# Patient Record
Sex: MALE | Race: WHITE | NOT HISPANIC OR LATINO | Employment: OTHER | URBAN - METROPOLITAN AREA
[De-identification: names, ages, dates, MRNs, and addresses within clinical notes are randomized per-mention and may not be internally consistent; named-entity substitution may affect disease eponyms.]

---

## 2023-06-16 LAB — PROSTATE SPECIFIC ANTIGEN,SCREEN: 0.5 NG/ML (ref 0–4)

## 2023-08-23 LAB
ALANINE AMINOTRANSFERASE (SGPT) (U/L) IN SER/PLAS: 19 U/L (ref 10–52)
ALBUMIN (G/DL) IN SER/PLAS: 4.3 G/DL (ref 3.4–5)
ALKALINE PHOSPHATASE (U/L) IN SER/PLAS: 58 U/L (ref 33–136)
ANION GAP IN SER/PLAS: 9 MMOL/L (ref 10–20)
ASPARTATE AMINOTRANSFERASE (SGOT) (U/L) IN SER/PLAS: 24 U/L (ref 9–39)
BILIRUBIN TOTAL (MG/DL) IN SER/PLAS: 1.1 MG/DL (ref 0–1.2)
CALCIUM (MG/DL) IN SER/PLAS: 9.5 MG/DL (ref 8.6–10.3)
CARBON DIOXIDE, TOTAL (MMOL/L) IN SER/PLAS: 28 MMOL/L (ref 21–32)
CHLORIDE (MMOL/L) IN SER/PLAS: 107 MMOL/L (ref 98–107)
CHOLESTEROL (MG/DL) IN SER/PLAS: 175 MG/DL (ref 0–199)
CHOLESTEROL IN HDL (MG/DL) IN SER/PLAS: 39.4 MG/DL
CHOLESTEROL/HDL RATIO: 4.4
CREATININE (MG/DL) IN SER/PLAS: 0.83 MG/DL (ref 0.5–1.3)
ESTIMATED AVERAGE GLUCOSE FOR HBA1C: 126 MG/DL
GFR MALE: >90 ML/MIN/1.73M2
GLUCOSE (MG/DL) IN SER/PLAS: 91 MG/DL (ref 74–99)
HEMOGLOBIN A1C/HEMOGLOBIN TOTAL IN BLOOD: 6 %
LDL: 108 MG/DL (ref 0–99)
POTASSIUM (MMOL/L) IN SER/PLAS: 5 MMOL/L (ref 3.5–5.3)
PROTEIN TOTAL: 6.4 G/DL (ref 6.4–8.2)
SODIUM (MMOL/L) IN SER/PLAS: 139 MMOL/L (ref 136–145)
TRIGLYCERIDE (MG/DL) IN SER/PLAS: 140 MG/DL (ref 0–149)
UREA NITROGEN (MG/DL) IN SER/PLAS: 14 MG/DL (ref 6–23)
VLDL: 28 MG/DL (ref 0–40)

## 2023-11-12 ENCOUNTER — APPOINTMENT (OUTPATIENT)
Dept: RADIOLOGY | Facility: HOSPITAL | Age: 63
End: 2023-11-12
Payer: MEDICARE

## 2023-11-12 ENCOUNTER — HOSPITAL ENCOUNTER (EMERGENCY)
Facility: HOSPITAL | Age: 63
Discharge: HOME | End: 2023-11-12
Attending: EMERGENCY MEDICINE
Payer: MEDICARE

## 2023-11-12 VITALS
SYSTOLIC BLOOD PRESSURE: 136 MMHG | RESPIRATION RATE: 16 BRPM | BODY MASS INDEX: 25.11 KG/M2 | DIASTOLIC BLOOD PRESSURE: 78 MMHG | OXYGEN SATURATION: 97 % | TEMPERATURE: 98.6 F | HEART RATE: 84 BPM | WEIGHT: 160 LBS | HEIGHT: 67 IN

## 2023-11-12 DIAGNOSIS — R42 DIZZINESS: Primary | ICD-10-CM

## 2023-11-12 LAB
ALBUMIN SERPL BCP-MCNC: 4.3 G/DL (ref 3.4–5)
ALP SERPL-CCNC: 58 U/L (ref 33–136)
ALT SERPL W P-5'-P-CCNC: 15 U/L (ref 10–52)
ANION GAP SERPL CALC-SCNC: 12 MMOL/L (ref 10–20)
APTT PPP: 29 SECONDS (ref 27–38)
AST SERPL W P-5'-P-CCNC: 19 U/L (ref 9–39)
BASOPHILS # BLD AUTO: 0.02 X10*3/UL (ref 0–0.1)
BASOPHILS NFR BLD AUTO: 0.3 %
BILIRUB SERPL-MCNC: 1 MG/DL (ref 0–1.2)
BUN SERPL-MCNC: 17 MG/DL (ref 6–23)
CALCIUM SERPL-MCNC: 9.6 MG/DL (ref 8.6–10.3)
CHLORIDE SERPL-SCNC: 106 MMOL/L (ref 98–107)
CO2 SERPL-SCNC: 23 MMOL/L (ref 21–32)
CREAT SERPL-MCNC: 0.86 MG/DL (ref 0.5–1.3)
EOSINOPHIL # BLD AUTO: 0 X10*3/UL (ref 0–0.7)
EOSINOPHIL NFR BLD AUTO: 0 %
ERYTHROCYTE [DISTWIDTH] IN BLOOD BY AUTOMATED COUNT: 13.4 % (ref 11.5–14.5)
GFR SERPL CREATININE-BSD FRML MDRD: >90 ML/MIN/1.73M*2
GLUCOSE SERPL-MCNC: 129 MG/DL (ref 74–99)
HCT VFR BLD AUTO: 40.4 % (ref 41–52)
HGB BLD-MCNC: 14 G/DL (ref 13.5–17.5)
IMM GRANULOCYTES # BLD AUTO: 0.02 X10*3/UL (ref 0–0.7)
IMM GRANULOCYTES NFR BLD AUTO: 0.3 % (ref 0–0.9)
INR PPP: 1 (ref 0.9–1.1)
LYMPHOCYTES # BLD AUTO: 1.48 X10*3/UL (ref 1.2–4.8)
LYMPHOCYTES NFR BLD AUTO: 19.6 %
MCH RBC QN AUTO: 30.6 PG (ref 26–34)
MCHC RBC AUTO-ENTMCNC: 34.7 G/DL (ref 32–36)
MCV RBC AUTO: 88 FL (ref 80–100)
MONOCYTES # BLD AUTO: 0.59 X10*3/UL (ref 0.1–1)
MONOCYTES NFR BLD AUTO: 7.8 %
NEUTROPHILS # BLD AUTO: 5.46 X10*3/UL (ref 1.2–7.7)
NEUTROPHILS NFR BLD AUTO: 72 %
NRBC BLD-RTO: 0 /100 WBCS (ref 0–0)
PLATELET # BLD AUTO: 166 X10*3/UL (ref 150–450)
POTASSIUM SERPL-SCNC: 4.1 MMOL/L (ref 3.5–5.3)
PROT SERPL-MCNC: 6.8 G/DL (ref 6.4–8.2)
PROTHROMBIN TIME: 10.8 SECONDS (ref 9.8–12.8)
RBC # BLD AUTO: 4.57 X10*6/UL (ref 4.5–5.9)
SODIUM SERPL-SCNC: 137 MMOL/L (ref 136–145)
WBC # BLD AUTO: 7.6 X10*3/UL (ref 4.4–11.3)

## 2023-11-12 PROCEDURE — 2500000004 HC RX 250 GENERAL PHARMACY W/ HCPCS (ALT 636 FOR OP/ED): Performed by: EMERGENCY MEDICINE

## 2023-11-12 PROCEDURE — 85610 PROTHROMBIN TIME: CPT | Performed by: EMERGENCY MEDICINE

## 2023-11-12 PROCEDURE — 70450 CT HEAD/BRAIN W/O DYE: CPT

## 2023-11-12 PROCEDURE — 99285 EMERGENCY DEPT VISIT HI MDM: CPT | Mod: 25 | Performed by: EMERGENCY MEDICINE

## 2023-11-12 PROCEDURE — 85025 COMPLETE CBC W/AUTO DIFF WBC: CPT | Performed by: EMERGENCY MEDICINE

## 2023-11-12 PROCEDURE — 36415 COLL VENOUS BLD VENIPUNCTURE: CPT | Performed by: EMERGENCY MEDICINE

## 2023-11-12 PROCEDURE — 85730 THROMBOPLASTIN TIME PARTIAL: CPT | Performed by: EMERGENCY MEDICINE

## 2023-11-12 PROCEDURE — 70450 CT HEAD/BRAIN W/O DYE: CPT | Performed by: RADIOLOGY

## 2023-11-12 PROCEDURE — 99284 EMERGENCY DEPT VISIT MOD MDM: CPT | Mod: 25

## 2023-11-12 PROCEDURE — 80053 COMPREHEN METABOLIC PANEL: CPT | Performed by: EMERGENCY MEDICINE

## 2023-11-12 PROCEDURE — 96360 HYDRATION IV INFUSION INIT: CPT

## 2023-11-12 RX ORDER — MECLIZINE HYDROCHLORIDE 25 MG/1
25 TABLET ORAL 3 TIMES DAILY PRN
Qty: 15 TABLET | Refills: 0 | Status: SHIPPED | OUTPATIENT
Start: 2023-11-12 | End: 2023-11-22

## 2023-11-12 RX ADMIN — SODIUM CHLORIDE 500 ML: 9 INJECTION, SOLUTION INTRAVENOUS at 10:20

## 2023-11-12 ASSESSMENT — COLUMBIA-SUICIDE SEVERITY RATING SCALE - C-SSRS
1. IN THE PAST MONTH, HAVE YOU WISHED YOU WERE DEAD OR WISHED YOU COULD GO TO SLEEP AND NOT WAKE UP?: NO
6. HAVE YOU EVER DONE ANYTHING, STARTED TO DO ANYTHING, OR PREPARED TO DO ANYTHING TO END YOUR LIFE?: NO
2. HAVE YOU ACTUALLY HAD ANY THOUGHTS OF KILLING YOURSELF?: NO

## 2023-11-12 ASSESSMENT — PAIN SCALES - GENERAL: PAINLEVEL_OUTOF10: 0 - NO PAIN

## 2023-11-12 ASSESSMENT — LIFESTYLE VARIABLES
REASON UNABLE TO ASSESS: YES
HAVE YOU EVER FELT YOU SHOULD CUT DOWN ON YOUR DRINKING: NO
HAVE PEOPLE ANNOYED YOU BY CRITICIZING YOUR DRINKING: NO
EVER HAD A DRINK FIRST THING IN THE MORNING TO STEADY YOUR NERVES TO GET RID OF A HANGOVER: NO

## 2023-11-12 ASSESSMENT — PAIN - FUNCTIONAL ASSESSMENT: PAIN_FUNCTIONAL_ASSESSMENT: 0-10

## 2023-11-12 NOTE — ED PROVIDER NOTES
HPI   No chief complaint on file.      Chief complaint: Malaise    History of present illness: Patient is a 63-year-old male presenting to the emergency department with complaints of feeling unwell.  According to the patient, patient's symptoms started approximately 8:00 this morning.  The patient states that he is feeling dizzy and overall somewhat unwell.  As result EMS was called.  The patient was brought to the emergency department for further evaluation.  The patient is denying any focal pain at this time he denies any recent fever he reiterates that he just feels unwell and has been experiencing dizziness.  He denies ever having symptoms like this in the past.        History provided by:  Patient   used: No                        No data recorded                Patient History   Past Medical History:   Diagnosis Date    Personal history of other diseases of the digestive system     History of gastroesophageal reflux (GERD)     Past Surgical History:   Procedure Laterality Date    OTHER SURGICAL HISTORY  06/27/2019    Cystoscopy    OTHER SURGICAL HISTORY  06/27/2019    Urethral dilation     No family history on file.  Social History     Tobacco Use    Smoking status: Not on file    Smokeless tobacco: Not on file   Substance Use Topics    Alcohol use: Not on file    Drug use: Not on file       Physical Exam   ED Triage Vitals   Temp Pulse Resp BP   -- -- -- --      SpO2 Temp src Heart Rate Source Patient Position   -- -- -- --      BP Location FiO2 (%)     -- --       Physical Exam  Vitals and nursing note reviewed.   Constitutional:       General: He is not in acute distress.     Appearance: He is well-developed.   HENT:      Head: Normocephalic and atraumatic.   Eyes:      Conjunctiva/sclera: Conjunctivae normal.   Cardiovascular:      Rate and Rhythm: Normal rate and regular rhythm.      Heart sounds: No murmur heard.  Pulmonary:      Effort: Pulmonary effort is normal. No respiratory  distress.      Breath sounds: Normal breath sounds.   Abdominal:      Palpations: Abdomen is soft.      Tenderness: There is no abdominal tenderness.   Musculoskeletal:         General: No swelling.      Cervical back: Neck supple.   Skin:     General: Skin is warm and dry.      Capillary Refill: Capillary refill takes less than 2 seconds.   Neurological:      Mental Status: He is alert.   Psychiatric:         Mood and Affect: Mood normal.         ED Course & MDM   Diagnoses as of 11/16/23 1700   Dizziness       Medical Decision Making  Medical decision making: Patient remained stable throughout his time in the emergency department.  CBC demonstrated no significant abnormalities Chem-7 and LFTs were all within normal limits INR is within normal limits.  CAT scan of the patient's abdomen head demonstrated no significant acute intracranial abnormalities.  Meanwhile the patient's EKG demonstrated a normal sinus rhythm with a rate of 82 bpm isoelectric ST segments widened QRSs with a right bundle branch block and a QTc of 469.    Patient presents to the emergency department with feeling unwell and dizzy.  Work-up was performed as above and demonstrated no significant abnormalities.  The patient was reassured at this time, the patient appears well and has a negative work-up he was instructed to return immediately for worsening symptoms but otherwise follow-up with a primary care physician.  The patient expressed understanding and agreement.  The patient was then discharged home in otherwise stable condition.    Amount and/or Complexity of Data Reviewed  Labs: ordered. Decision-making details documented in ED Course.  Radiology: ordered. Decision-making details documented in ED Course.  ECG/medicine tests: ordered and independent interpretation performed.        Procedure  Procedures     Marcus Avila MD  11/16/23 2876

## 2024-05-23 ENCOUNTER — LAB (OUTPATIENT)
Dept: LAB | Facility: LAB | Age: 64
End: 2024-05-23
Payer: MEDICARE

## 2024-05-23 DIAGNOSIS — Z13.228 ENCOUNTER FOR SCREENING FOR OTHER METABOLIC DISORDERS: ICD-10-CM

## 2024-05-23 DIAGNOSIS — R97.1 ELEVATED CANCER ANTIGEN 125 (CA 125): Primary | ICD-10-CM

## 2024-05-23 DIAGNOSIS — R73.9 HYPERGLYCEMIA, UNSPECIFIED: ICD-10-CM

## 2024-05-23 LAB
ALBUMIN SERPL BCP-MCNC: 4.6 G/DL (ref 3.4–5)
ALP SERPL-CCNC: 58 U/L (ref 33–136)
ALT SERPL W P-5'-P-CCNC: 16 U/L (ref 10–52)
ANION GAP SERPL CALC-SCNC: 11 MMOL/L (ref 10–20)
AST SERPL W P-5'-P-CCNC: 19 U/L (ref 9–39)
BILIRUB SERPL-MCNC: 1.2 MG/DL (ref 0–1.2)
BUN SERPL-MCNC: 15 MG/DL (ref 6–23)
CALCIUM SERPL-MCNC: 9.8 MG/DL (ref 8.6–10.3)
CHLORIDE SERPL-SCNC: 105 MMOL/L (ref 98–107)
CHOLEST SERPL-MCNC: 173 MG/DL (ref 0–199)
CHOLESTEROL/HDL RATIO: 3.7
CO2 SERPL-SCNC: 29 MMOL/L (ref 21–32)
CREAT SERPL-MCNC: 0.78 MG/DL (ref 0.5–1.3)
EGFRCR SERPLBLD CKD-EPI 2021: >90 ML/MIN/1.73M*2
ERYTHROCYTE [DISTWIDTH] IN BLOOD BY AUTOMATED COUNT: 13.7 % (ref 11.5–14.5)
EST. AVERAGE GLUCOSE BLD GHB EST-MCNC: 123 MG/DL
GLUCOSE SERPL-MCNC: 77 MG/DL (ref 74–99)
HBA1C MFR BLD: 5.9 %
HCT VFR BLD AUTO: 43.1 % (ref 41–52)
HDLC SERPL-MCNC: 46.9 MG/DL
HGB BLD-MCNC: 14.4 G/DL (ref 13.5–17.5)
LDLC SERPL CALC-MCNC: 93 MG/DL
MCH RBC QN AUTO: 30.6 PG (ref 26–34)
MCHC RBC AUTO-ENTMCNC: 33.4 G/DL (ref 32–36)
MCV RBC AUTO: 92 FL (ref 80–100)
NON HDL CHOLESTEROL: 126 MG/DL (ref 0–149)
NRBC BLD-RTO: 0 /100 WBCS (ref 0–0)
PLATELET # BLD AUTO: 196 X10*3/UL (ref 150–450)
POTASSIUM SERPL-SCNC: 4.9 MMOL/L (ref 3.5–5.3)
PROT SERPL-MCNC: 6.8 G/DL (ref 6.4–8.2)
PSA SERPL-MCNC: 0.51 NG/ML
RBC # BLD AUTO: 4.71 X10*6/UL (ref 4.5–5.9)
SODIUM SERPL-SCNC: 140 MMOL/L (ref 136–145)
TRIGL SERPL-MCNC: 167 MG/DL (ref 0–149)
TSH SERPL-ACNC: 0.81 MIU/L (ref 0.44–3.98)
VLDL: 33 MG/DL (ref 0–40)
WBC # BLD AUTO: 6.3 X10*3/UL (ref 4.4–11.3)

## 2024-05-23 PROCEDURE — 80053 COMPREHEN METABOLIC PANEL: CPT

## 2024-05-23 PROCEDURE — 80061 LIPID PANEL: CPT

## 2024-05-23 PROCEDURE — 83036 HEMOGLOBIN GLYCOSYLATED A1C: CPT

## 2024-05-23 PROCEDURE — 84443 ASSAY THYROID STIM HORMONE: CPT

## 2024-05-23 PROCEDURE — 84153 ASSAY OF PSA TOTAL: CPT

## 2024-05-23 PROCEDURE — 36415 COLL VENOUS BLD VENIPUNCTURE: CPT

## 2024-05-23 PROCEDURE — 85027 COMPLETE CBC AUTOMATED: CPT

## 2024-06-26 ENCOUNTER — APPOINTMENT (OUTPATIENT)
Dept: RADIOLOGY | Facility: HOSPITAL | Age: 64
End: 2024-06-26
Payer: MEDICARE

## 2024-06-26 ENCOUNTER — HOSPITAL ENCOUNTER (EMERGENCY)
Facility: HOSPITAL | Age: 64
Discharge: HOME | End: 2024-06-26
Attending: STUDENT IN AN ORGANIZED HEALTH CARE EDUCATION/TRAINING PROGRAM
Payer: MEDICARE

## 2024-06-26 VITALS
BODY MASS INDEX: 22.76 KG/M2 | HEIGHT: 67 IN | TEMPERATURE: 97.6 F | RESPIRATION RATE: 18 BRPM | SYSTOLIC BLOOD PRESSURE: 173 MMHG | OXYGEN SATURATION: 99 % | DIASTOLIC BLOOD PRESSURE: 78 MMHG | HEART RATE: 71 BPM | WEIGHT: 145 LBS

## 2024-06-26 DIAGNOSIS — R10.11 RIGHT UPPER QUADRANT ABDOMINAL PAIN: Primary | ICD-10-CM

## 2024-06-26 LAB
ALBUMIN SERPL BCP-MCNC: 4.8 G/DL (ref 3.4–5)
ALP SERPL-CCNC: 60 U/L (ref 33–136)
ALT SERPL W P-5'-P-CCNC: 21 U/L (ref 10–52)
ANION GAP SERPL CALC-SCNC: 16 MMOL/L (ref 10–20)
AST SERPL W P-5'-P-CCNC: 21 U/L (ref 9–39)
BASOPHILS # BLD AUTO: 0.02 X10*3/UL (ref 0–0.1)
BASOPHILS NFR BLD AUTO: 0.2 %
BILIRUB DIRECT SERPL-MCNC: 0.1 MG/DL (ref 0–0.3)
BILIRUB SERPL-MCNC: 1.5 MG/DL (ref 0–1.2)
BUN SERPL-MCNC: 17 MG/DL (ref 6–23)
CALCIUM SERPL-MCNC: 10 MG/DL (ref 8.6–10.3)
CHLORIDE SERPL-SCNC: 103 MMOL/L (ref 98–107)
CO2 SERPL-SCNC: 22 MMOL/L (ref 21–32)
CREAT SERPL-MCNC: 0.91 MG/DL (ref 0.5–1.3)
EGFRCR SERPLBLD CKD-EPI 2021: >90 ML/MIN/1.73M*2
EOSINOPHIL # BLD AUTO: 0 X10*3/UL (ref 0–0.7)
EOSINOPHIL NFR BLD AUTO: 0 %
ERYTHROCYTE [DISTWIDTH] IN BLOOD BY AUTOMATED COUNT: 13.5 % (ref 11.5–14.5)
GLUCOSE SERPL-MCNC: 168 MG/DL (ref 74–99)
HCT VFR BLD AUTO: 43 % (ref 41–52)
HGB BLD-MCNC: 14.7 G/DL (ref 13.5–17.5)
IMM GRANULOCYTES # BLD AUTO: 0.04 X10*3/UL (ref 0–0.7)
IMM GRANULOCYTES NFR BLD AUTO: 0.4 % (ref 0–0.9)
LACTATE SERPL-SCNC: 2.2 MMOL/L (ref 0.4–2)
LACTATE SERPL-SCNC: 4.4 MMOL/L (ref 0.4–2)
LIPASE SERPL-CCNC: 45 U/L (ref 9–82)
LYMPHOCYTES # BLD AUTO: 1.29 X10*3/UL (ref 1.2–4.8)
LYMPHOCYTES NFR BLD AUTO: 12.9 %
MAGNESIUM SERPL-MCNC: 1.65 MG/DL (ref 1.6–2.4)
MCH RBC QN AUTO: 30.3 PG (ref 26–34)
MCHC RBC AUTO-ENTMCNC: 34.2 G/DL (ref 32–36)
MCV RBC AUTO: 89 FL (ref 80–100)
MONOCYTES # BLD AUTO: 0.75 X10*3/UL (ref 0.1–1)
MONOCYTES NFR BLD AUTO: 7.5 %
NEUTROPHILS # BLD AUTO: 7.93 X10*3/UL (ref 1.2–7.7)
NEUTROPHILS NFR BLD AUTO: 79 %
NRBC BLD-RTO: 0 /100 WBCS (ref 0–0)
PLATELET # BLD AUTO: 191 X10*3/UL (ref 150–450)
POTASSIUM SERPL-SCNC: 4 MMOL/L (ref 3.5–5.3)
PROT SERPL-MCNC: 7.3 G/DL (ref 6.4–8.2)
RBC # BLD AUTO: 4.85 X10*6/UL (ref 4.5–5.9)
SODIUM SERPL-SCNC: 137 MMOL/L (ref 136–145)
WBC # BLD AUTO: 10 X10*3/UL (ref 4.4–11.3)

## 2024-06-26 PROCEDURE — 83735 ASSAY OF MAGNESIUM: CPT | Performed by: STUDENT IN AN ORGANIZED HEALTH CARE EDUCATION/TRAINING PROGRAM

## 2024-06-26 PROCEDURE — 84075 ASSAY ALKALINE PHOSPHATASE: CPT | Performed by: STUDENT IN AN ORGANIZED HEALTH CARE EDUCATION/TRAINING PROGRAM

## 2024-06-26 PROCEDURE — 96375 TX/PRO/DX INJ NEW DRUG ADDON: CPT

## 2024-06-26 PROCEDURE — 36415 COLL VENOUS BLD VENIPUNCTURE: CPT | Performed by: STUDENT IN AN ORGANIZED HEALTH CARE EDUCATION/TRAINING PROGRAM

## 2024-06-26 PROCEDURE — 83690 ASSAY OF LIPASE: CPT | Performed by: STUDENT IN AN ORGANIZED HEALTH CARE EDUCATION/TRAINING PROGRAM

## 2024-06-26 PROCEDURE — 2500000004 HC RX 250 GENERAL PHARMACY W/ HCPCS (ALT 636 FOR OP/ED): Performed by: STUDENT IN AN ORGANIZED HEALTH CARE EDUCATION/TRAINING PROGRAM

## 2024-06-26 PROCEDURE — 76705 ECHO EXAM OF ABDOMEN: CPT | Mod: FOREIGN READ | Performed by: RADIOLOGY

## 2024-06-26 PROCEDURE — 96361 HYDRATE IV INFUSION ADD-ON: CPT

## 2024-06-26 PROCEDURE — 96374 THER/PROPH/DIAG INJ IV PUSH: CPT

## 2024-06-26 PROCEDURE — 83605 ASSAY OF LACTIC ACID: CPT | Mod: 91 | Performed by: STUDENT IN AN ORGANIZED HEALTH CARE EDUCATION/TRAINING PROGRAM

## 2024-06-26 PROCEDURE — 80048 BASIC METABOLIC PNL TOTAL CA: CPT | Performed by: STUDENT IN AN ORGANIZED HEALTH CARE EDUCATION/TRAINING PROGRAM

## 2024-06-26 PROCEDURE — 99284 EMERGENCY DEPT VISIT MOD MDM: CPT | Mod: 25

## 2024-06-26 PROCEDURE — 76705 ECHO EXAM OF ABDOMEN: CPT

## 2024-06-26 PROCEDURE — 85025 COMPLETE CBC W/AUTO DIFF WBC: CPT | Performed by: STUDENT IN AN ORGANIZED HEALTH CARE EDUCATION/TRAINING PROGRAM

## 2024-06-26 RX ORDER — ONDANSETRON HYDROCHLORIDE 2 MG/ML
4 INJECTION, SOLUTION INTRAVENOUS ONCE
Status: COMPLETED | OUTPATIENT
Start: 2024-06-26 | End: 2024-06-26

## 2024-06-26 RX ORDER — KETOROLAC TROMETHAMINE 30 MG/ML
15 INJECTION, SOLUTION INTRAMUSCULAR; INTRAVENOUS ONCE
Status: COMPLETED | OUTPATIENT
Start: 2024-06-26 | End: 2024-06-26

## 2024-06-26 ASSESSMENT — PAIN DESCRIPTION - FREQUENCY: FREQUENCY: CONSTANT/CONTINUOUS

## 2024-06-26 ASSESSMENT — PAIN SCALES - GENERAL
PAINLEVEL_OUTOF10: 8
PAINLEVEL_OUTOF10: 3

## 2024-06-26 ASSESSMENT — PAIN DESCRIPTION - DESCRIPTORS
DESCRIPTORS: ACHING
DESCRIPTORS: ACHING

## 2024-06-26 ASSESSMENT — PAIN - FUNCTIONAL ASSESSMENT: PAIN_FUNCTIONAL_ASSESSMENT: 0-10

## 2024-06-26 ASSESSMENT — PAIN DESCRIPTION - LOCATION: LOCATION: ABDOMEN

## 2024-06-26 ASSESSMENT — PAIN DESCRIPTION - PAIN TYPE: TYPE: ACUTE PAIN

## 2024-06-26 NOTE — ED PROVIDER NOTES
"    HPI   Chief Complaint   Patient presents with    Abdominal Pain       HPI:   Patient is a 64-year-old male he is presenting to the emergency department for abdominal pain.  Right upper quadrant, began after breakfast this morning, sharp stabbing pain, associated with nausea.  Denies any trauma.  No radiation of the pain.  No alleviating or aggravating factors, did not take anything prior to arrival, no dysuria or polyuria, no diarrhea or constipation. reports 10/10 pain      ROS: Complete 12 point review of systems performed, otherwise negative except as noted in the history of present illness    PMH: Reviewed, documented below in note. Pertinents in HPI  PSH: Reviewed and documented below in note. Pertinents in HPI  SH: No illicits.  Not homeless.  Fam: Reviewed, noncontributory to patients current complaint  MEDS: Reviewed and documented below in note. Pertinents in HPI  ALLERGIES: Reviewed and documented below in note.        History provided by:  Patient and relative   used: No                          [place] Coma Scale Score: 15                  Patient History   Past Medical History:   Diagnosis Date    Personal history of other diseases of the digestive system     History of gastroesophageal reflux (GERD)    Skull fracture (Multi)      Past Surgical History:   Procedure Laterality Date    OTHER SURGICAL HISTORY  06/27/2019    Cystoscopy    OTHER SURGICAL HISTORY  06/27/2019    Urethral dilation     No family history on file.  Social History     Tobacco Use    Smoking status: Every Day     Types: Cigarettes    Smokeless tobacco: Not on file   Substance Use Topics    Alcohol use: Not Currently    Drug use: Not Currently       Physical Exam   Visit Vitals  /78   Pulse 71   Temp 36.4 °C (97.6 °F) (Temporal)   Resp 18   Ht 1.702 m (5' 7\")   Wt 65.8 kg (145 lb)   SpO2 99%   BMI 22.71 kg/m²   Smoking Status Every Day   BSA 1.76 m²      Physical Exam  Vitals and nursing note reviewed. "   Constitutional:       Appearance: Normal appearance.   HENT:      Head: Normocephalic and atraumatic.   Neck:      Vascular: No carotid bruit.   Cardiovascular:      Rate and Rhythm: Regular rhythm. Tachycardia present.      Pulses: Normal pulses.      Heart sounds: Normal heart sounds.   Pulmonary:      Effort: Pulmonary effort is normal.      Breath sounds: Normal breath sounds.   Abdominal:      General: Abdomen is flat. There is no distension.      Palpations: Abdomen is soft.      Tenderness: There is abdominal tenderness in the right upper quadrant and epigastric area. There is no right CVA tenderness, left CVA tenderness, guarding or rebound. Positive signs include West's sign.   Musculoskeletal:         General: No tenderness, deformity or signs of injury.      Cervical back: Normal range of motion. No rigidity.   Skin:     General: Skin is warm and dry.      Capillary Refill: Capillary refill takes less than 2 seconds.   Neurological:      General: No focal deficit present.      Mental Status: He is alert and oriented to person, place, and time.      Sensory: No sensory deficit.      Motor: No weakness.   Psychiatric:         Mood and Affect: Mood normal.         Behavior: Behavior normal.         US gallbladder   Final Result   Unremarkable ultrasound of the right upper quadrant.  No   cholelithiasis, gallbladder wall thickening, or biliary dilatation is   appreciated.   .    Signed by Wesley Darby MD          Labs Reviewed   CBC WITH AUTO DIFFERENTIAL - Abnormal       Result Value    WBC 10.0      nRBC 0.0      RBC 4.85      Hemoglobin 14.7      Hematocrit 43.0      MCV 89      MCH 30.3      MCHC 34.2      RDW 13.5      Platelets 191      Neutrophils % 79.0      Immature Granulocytes %, Automated 0.4      Lymphocytes % 12.9      Monocytes % 7.5      Eosinophils % 0.0      Basophils % 0.2      Neutrophils Absolute 7.93 (*)     Immature Granulocytes Absolute, Automated 0.04      Lymphocytes Absolute  1.29      Monocytes Absolute 0.75      Eosinophils Absolute 0.00      Basophils Absolute 0.02     LACTATE - Abnormal    Lactate 4.4 (*)     Narrative:     Venipuncture immediately after or during the administration of Metamizole may lead to falsely low results. Testing should be performed immediately  prior to Metamizole dosing.   HEPATIC FUNCTION PANEL - Abnormal    Albumin 4.8      Bilirubin, Total 1.5 (*)     Bilirubin, Direct 0.1      Alkaline Phosphatase 60      ALT 21      AST 21      Total Protein 7.3     BASIC METABOLIC PANEL - Abnormal    Glucose 168 (*)     Sodium 137      Potassium 4.0      Chloride 103      Bicarbonate 22      Anion Gap 16      Urea Nitrogen 17      Creatinine 0.91      eGFR >90      Calcium 10.0     LACTATE - Abnormal    Lactate 2.2 (*)     Narrative:     Venipuncture immediately after or during the administration of Metamizole may lead to falsely low results. Testing should be performed immediately  prior to Metamizole dosing.   MAGNESIUM - Normal    Magnesium 1.65     LIPASE - Normal    Lipase 45      Narrative:     Venipuncture immediately after or during the administration of Metamizole may lead to falsely low results. Testing should be performed immediately prior to Metamizole dosing.         ED Course & MDM   Diagnoses as of 06/26/24 1724   Right upper quadrant abdominal pain           Medical Decision Making  All mentioned lab results, ECGs, and imaging were independently reviewed by myself  - Patient evaluated. Patient is presenting to the emergency department for abdominal pain primarily in the right upper quadrant.  Associated with nausea.  Differential at this time includes but is not limited to potential cholelithiasis, cholecystitis, choledocholithiasis, gastritis, peptic ulcer disease, colitis, obstruction, or viral gastroenteritis to name a few.  IV was inserted, given analgesia, antiemetics, and IV fluids and basic labs were obtained.  No leukocytosis or anemia, normal  kidney liver and pancreatic function noted, normal electrolytes.  Lipase is within normal limits.  He does have a mildly elevated total bili at 1.5 but appears to be stable from prior.  His initial lactate was elevated at 4.4 however improved to 2.2 after fluid resuscitation.  Right upper quadrant ultrasound was performed and is negative for any acute pathology.  On repeat evaluation patient states he is feeling better.  I did offer the patient a CT scan to further evaluate where his abdominal pain was coming from however the patient declined this at this time.  I discussed the CT scan would be looking for alternative pathologies for his abdominal pain given his age and potential risk factors.  Using joint decision-making as well as a risk-benefit conversation with the patient and family the patient would prefer to go home and follow-up with his primary care physician now that he is feeling better.  He did say that if his symptoms were to return or get worse he would return to the emergency department for repeat evaluation at that time.  I do feel that this is reasonable given his improved symptoms, stable vitals, and benign abdominal exam on repeat evaluation.  All questions were answered and the patient was discharged otherwise stable condition    - Monitored for any changes in stability or symptomatology. Patient remained stable.   - Counseled regarding labs, imaging, diagnosis, and plan. Patient was agreeable. All questions were answered. The patient was receptive and agreeable to the plan of care.   -The patient was instructed to return to the emergency department if any symptoms recurred, worsened, or if there were any additional concerns.    *Disclaimer: This note was dictated by speech recognition. Minor errors in transcription may be present. Please call with questions.    Shubham Allison MD             Your medication list      as of June 26, 2024  5:21 PM     You have not been prescribed any  medications.         Procedure  Procedures     *This report was transcribed using voice recognition software.  Every effort was made to ensure accuracy; however, inadvertent computerized transcription errors may be present.*  Jimmy Allison MD  06/26/24         Jimmy Allison MD  06/26/24 [id number]

## 2024-06-30 ENCOUNTER — HOSPITAL ENCOUNTER (OUTPATIENT)
Dept: RADIOLOGY | Facility: HOSPITAL | Age: 64
Discharge: HOME | End: 2024-06-30
Payer: MEDICARE

## 2024-06-30 DIAGNOSIS — R10.11 RIGHT UPPER QUADRANT PAIN: ICD-10-CM

## 2024-06-30 PROCEDURE — 74150 CT ABDOMEN W/O CONTRAST: CPT

## 2024-07-01 ENCOUNTER — APPOINTMENT (OUTPATIENT)
Dept: RADIOLOGY | Facility: HOSPITAL | Age: 64
End: 2024-07-01
Payer: MEDICARE

## 2025-01-02 ENCOUNTER — HOSPITAL ENCOUNTER (EMERGENCY)
Facility: HOSPITAL | Age: 65
Discharge: HOME | End: 2025-01-02
Attending: STUDENT IN AN ORGANIZED HEALTH CARE EDUCATION/TRAINING PROGRAM
Payer: MEDICARE

## 2025-01-02 ENCOUNTER — APPOINTMENT (OUTPATIENT)
Dept: RADIOLOGY | Facility: HOSPITAL | Age: 65
End: 2025-01-02
Payer: MEDICARE

## 2025-01-02 VITALS
HEART RATE: 79 BPM | OXYGEN SATURATION: 99 % | WEIGHT: 160 LBS | BODY MASS INDEX: 25.11 KG/M2 | SYSTOLIC BLOOD PRESSURE: 166 MMHG | DIASTOLIC BLOOD PRESSURE: 83 MMHG | RESPIRATION RATE: 16 BRPM | HEIGHT: 67 IN | TEMPERATURE: 99 F

## 2025-01-02 DIAGNOSIS — M54.50 LUMBAR BACK PAIN: Primary | ICD-10-CM

## 2025-01-02 DIAGNOSIS — M48.061 SPINAL STENOSIS OF LUMBAR REGION WITHOUT NEUROGENIC CLAUDICATION: ICD-10-CM

## 2025-01-02 PROCEDURE — 2500000004 HC RX 250 GENERAL PHARMACY W/ HCPCS (ALT 636 FOR OP/ED): Performed by: STUDENT IN AN ORGANIZED HEALTH CARE EDUCATION/TRAINING PROGRAM

## 2025-01-02 PROCEDURE — 2500000005 HC RX 250 GENERAL PHARMACY W/O HCPCS: Performed by: STUDENT IN AN ORGANIZED HEALTH CARE EDUCATION/TRAINING PROGRAM

## 2025-01-02 PROCEDURE — 96372 THER/PROPH/DIAG INJ SC/IM: CPT | Performed by: STUDENT IN AN ORGANIZED HEALTH CARE EDUCATION/TRAINING PROGRAM

## 2025-01-02 PROCEDURE — 72131 CT LUMBAR SPINE W/O DYE: CPT | Mod: FOREIGN READ | Performed by: RADIOLOGY

## 2025-01-02 PROCEDURE — 99284 EMERGENCY DEPT VISIT MOD MDM: CPT | Performed by: STUDENT IN AN ORGANIZED HEALTH CARE EDUCATION/TRAINING PROGRAM

## 2025-01-02 PROCEDURE — 72131 CT LUMBAR SPINE W/O DYE: CPT

## 2025-01-02 RX ORDER — LIDOCAINE 560 MG/1
1 PATCH PERCUTANEOUS; TOPICAL; TRANSDERMAL ONCE
Status: DISCONTINUED | OUTPATIENT
Start: 2025-01-02 | End: 2025-01-02 | Stop reason: HOSPADM

## 2025-01-02 RX ORDER — MORPHINE SULFATE 4 MG/ML
4 INJECTION INTRAVENOUS ONCE
Status: COMPLETED | OUTPATIENT
Start: 2025-01-02 | End: 2025-01-02

## 2025-01-02 RX ORDER — ACETAMINOPHEN 500 MG
1000 TABLET ORAL EVERY 6 HOURS PRN
Qty: 30 TABLET | Refills: 0 | Status: SHIPPED | OUTPATIENT
Start: 2025-01-02 | End: 2025-01-12

## 2025-01-02 RX ORDER — LIDOCAINE 50 MG/G
1 PATCH TOPICAL DAILY
Qty: 5 PATCH | Refills: 0 | Status: SHIPPED | OUTPATIENT
Start: 2025-01-02

## 2025-01-02 RX ORDER — NAPROXEN 500 MG/1
500 TABLET ORAL
Qty: 30 TABLET | Refills: 0 | Status: SHIPPED | OUTPATIENT
Start: 2025-01-02 | End: 2025-01-17

## 2025-01-02 RX ORDER — PREDNISONE 50 MG/1
50 TABLET ORAL DAILY
Qty: 4 TABLET | Refills: 0 | Status: SHIPPED | OUTPATIENT
Start: 2025-01-02 | End: 2025-01-06

## 2025-01-02 RX ORDER — PREDNISONE 50 MG/1
50 TABLET ORAL ONCE
Status: COMPLETED | OUTPATIENT
Start: 2025-01-02 | End: 2025-01-02

## 2025-01-02 RX ORDER — METHOCARBAMOL 500 MG/1
500 TABLET, FILM COATED ORAL 2 TIMES DAILY
Qty: 20 TABLET | Refills: 0 | Status: SHIPPED | OUTPATIENT
Start: 2025-01-02 | End: 2025-01-12

## 2025-01-02 RX ADMIN — LIDOCAINE 4% 1 PATCH: 40 PATCH TOPICAL at 11:53

## 2025-01-02 RX ADMIN — MORPHINE SULFATE 4 MG: 4 INJECTION INTRAVENOUS at 11:53

## 2025-01-02 RX ADMIN — PREDNISONE 50 MG: 50 TABLET ORAL at 11:52

## 2025-01-02 ASSESSMENT — PAIN - FUNCTIONAL ASSESSMENT: PAIN_FUNCTIONAL_ASSESSMENT: 0-10

## 2025-01-02 ASSESSMENT — PAIN SCALES - GENERAL: PAINLEVEL_OUTOF10: 7

## 2025-01-02 ASSESSMENT — COLUMBIA-SUICIDE SEVERITY RATING SCALE - C-SSRS
1. IN THE PAST MONTH, HAVE YOU WISHED YOU WERE DEAD OR WISHED YOU COULD GO TO SLEEP AND NOT WAKE UP?: NO
2. HAVE YOU ACTUALLY HAD ANY THOUGHTS OF KILLING YOURSELF?: NO
6. HAVE YOU EVER DONE ANYTHING, STARTED TO DO ANYTHING, OR PREPARED TO DO ANYTHING TO END YOUR LIFE?: NO

## 2025-01-02 ASSESSMENT — PAIN DESCRIPTION - ORIENTATION: ORIENTATION: LOWER

## 2025-01-02 ASSESSMENT — PAIN DESCRIPTION - LOCATION: LOCATION: BACK

## 2025-01-02 NOTE — ED PROVIDER NOTES
HPI   Chief Complaint   Patient presents with    lower back pain radiating down left leg       64-year-old male with no pertinent past medical issues found to ED with low back pain.  Patient that he said this pain for quite some time.  He says it has been going on for at least over a year.  Said it got worse when he was picking up his dog to put it into a dog bed at around Thanksgiving time.  He says about pain that sometimes shoot down his left leg and sometimes shoots down his right leg.  Denies any recent changes in his symptoms that could mean coming to the ER.  He says he tried to call to get in with a pain management doctor today however he was told he needed referral sets why came to the ED.  Denies any weakness or numbness to the lower extremities.  No urinary retention or incontinence.  No history of drug or alcohol abuse.  No fevers.  No recent falls or trauma.  No history of diabetes.  Not immunocompromise.  No history of cancer              Patient History   Past Medical History:   Diagnosis Date    Personal history of other diseases of the digestive system     History of gastroesophageal reflux (GERD)    Skull fracture (Multi)      Past Surgical History:   Procedure Laterality Date    OTHER SURGICAL HISTORY  06/27/2019    Cystoscopy    OTHER SURGICAL HISTORY  06/27/2019    Urethral dilation     No family history on file.  Social History     Tobacco Use    Smoking status: Every Day     Types: Cigarettes    Smokeless tobacco: Not on file   Substance Use Topics    Alcohol use: Not Currently    Drug use: Not Currently       Physical Exam   ED Triage Vitals [01/02/25 1055]   Temperature Heart Rate Respirations BP   37.2 °C (99 °F) 79 16 166/83      Pulse Ox Temp src Heart Rate Source Patient Position   99 % -- -- --      BP Location FiO2 (%)     -- --       Physical Exam  Vitals and nursing note reviewed.   Constitutional:       General: He is not in acute distress.     Appearance: He is well-developed.    HENT:      Head: Normocephalic and atraumatic.   Eyes:      Conjunctiva/sclera: Conjunctivae normal.   Cardiovascular:      Rate and Rhythm: Normal rate and regular rhythm.      Heart sounds: No murmur heard.  Pulmonary:      Effort: Pulmonary effort is normal. No respiratory distress.      Breath sounds: Normal breath sounds.   Abdominal:      Palpations: Abdomen is soft.      Tenderness: There is no abdominal tenderness.   Musculoskeletal:         General: No swelling.      Cervical back: Neck supple.      Comments: No weakness or numbness to the legs.  Normal hip flexion extension bilateral.  Normal knee flexion and extension, plantar and dorsi flexion bilateral.  Normal DP and PT pulse bilaterally.  Normal patellar and Achilles reflexes bilaterally.     Skin:     General: Skin is warm and dry.      Capillary Refill: Capillary refill takes less than 2 seconds.   Neurological:      Mental Status: He is alert.   Psychiatric:         Mood and Affect: Mood normal.           ED Course & MDM   Diagnoses as of 01/02/25 1354   Lumbar back pain   Spinal stenosis of lumbar region without neurogenic claudication                 No data recorded     [place] Coma Scale Score: 15 (01/02/25 1056 : Braden Martinez RN)                           Medical Decision Making  HISTORIAN:  Patient    CHART REVIEW:  No pertinent finding    PT SUMMARY:  64-year-old male presented ED with concerns for back pain.  Vital signs stable.    DDX:  MSK pain, osteoarthritis, fracture, radiculopathy, spinal infection      PLAN:  Will obtain CT lumbar spine    DISPO/RE-EVAL:  CT lumbar spine shows many chronic findings such as spinal stenosis and signs of osteoarthritis.  No acute fracture or masses.  I suspect the patient's symptoms are likely muscle skeletal related.  Low suspicion for cauda equina syndrome as he has no urinary tension or neurological deficits on exam.  Low suspicion for spinal infection as he has no risk factors for this.   Patient able to ambulate at baseline and symptoms are improved after medications here in the ED.  Therefore, will discharge patient home with pain medications to use as needed for his symptoms.  Will refer him to spine and pain management.  Recommend coming back to the ED for any new or worsening symptoms.          Procedure  Procedures     Isaac Guzman DO  01/02/25 7946

## 2025-01-02 NOTE — DISCHARGE INSTRUCTIONS
Take medications as prescribed.  Follow-up with the pain management and orthopedic doctor soon as possible.  Come back to the ED for any new or worsening symptoms.

## 2025-01-28 ENCOUNTER — APPOINTMENT (OUTPATIENT)
Dept: ORTHOPEDIC SURGERY | Facility: CLINIC | Age: 65
End: 2025-01-28
Payer: MEDICARE

## 2025-01-28 DIAGNOSIS — M54.16 LUMBAR RADICULOPATHY: Primary | ICD-10-CM

## 2025-01-28 DIAGNOSIS — M54.50 LUMBAR BACK PAIN: ICD-10-CM

## 2025-01-28 PROCEDURE — 1159F MED LIST DOCD IN RCRD: CPT | Performed by: PHYSICIAN ASSISTANT

## 2025-01-28 PROCEDURE — 99204 OFFICE O/P NEW MOD 45 MIN: CPT | Performed by: PHYSICIAN ASSISTANT

## 2025-01-28 PROCEDURE — 1160F RVW MEDS BY RX/DR IN RCRD: CPT | Performed by: PHYSICIAN ASSISTANT

## 2025-01-28 NOTE — PROGRESS NOTES
Chief Complaint: Lumbar radiculopathy    HPI: Mateo Smith is a 65 y.o. male patient presenting with 2 months of low back pain with radiculopathy.  His symptoms started around Thanksgiving, he was lifting his dog onto the bed when he felt sharp pain.  He has left greater than right sided low back pain.  His pain radiates diffusely down his legs, left greater than right.  His left foot is numb.  He denies weakness, dragging or tripping over his feet.  He has full control of his bowel and bladder.    He was seen in the ED on 1/2, he was given prescriptions for Tylenol, lidocaine patches, methocarbamol, naproxen, and prednisone.  He completed all these medications and did see moderate improvement of his symptoms.    No anticoagulations.  Positive tobacco use, couple cigarettes per day.    Review of Systems    All other systems have been reviewed and are negative for complaint. All pertinent positive and negative as listed in history of present illness.    Past Medical History:   Diagnosis Date    Personal history of other diseases of the digestive system     History of gastroesophageal reflux (GERD)    Skull fracture (Multi)         Past Surgical History:   Procedure Laterality Date    OTHER SURGICAL HISTORY  06/27/2019    Cystoscopy    OTHER SURGICAL HISTORY  06/27/2019    Urethral dilation        No Known Allergies     Current Outpatient Medications on File Prior to Visit   Medication Sig Dispense Refill    lidocaine (Lidoderm) 5 % patch Place 1 patch over 12 hours on the skin once daily. Remove & discard patch within 12 hours or as directed by MD. 5 patch 0    methocarbamol (Robaxin) 500 mg tablet Take 1 tablet (500 mg) by mouth 2 times a day for 10 days. 20 tablet 0     No current facility-administered medications on file prior to visit.          PE:   Const: Well-appearing, well-nourished [male] in no distress.  Eyes: Normal appearing sclera and conjunctiva, no jaundice, pupils normal in appearance.  Resp:  breathing comfortably, normal respiratory rate.  CV: No upper or lower extremity edema.  Musculoskeletal: [Normal gait].  Lumbar ROM is [supple].  Strength exam of the lower extremities reveals 5/5 strength in all major muscle groups.  [Negative] straight leg raise [bilaterally].  Neuro: Sensation is intact and equal bilaterally. Deep tendon reflexes are [normal and symmetric].  [No clonus].  Skin: Intact without any lesions, normal turgor.  Psych: Alert and oriented x3, normal mood and affect.        Imaging:  I personally reviewed a CT scan of the lumbar spine from 1/2. There is no evidence of acute fracture. There is an anterolisthesis of L4 on 5.            A/P:    He has an appointment with Dr. Duque from pain management later this week.  He is interested in discussing injections.  I recommend he start some physical therapy, a referral was provided today.  If he does not have improvement after 6 weeks physical therapy and injections he should follow-up with me at which I will consider an MRI of the lumbar spine.      **This note was dictated using speech recognition software and was not corrected for spelling or grammatical errors**

## 2025-01-30 ENCOUNTER — OFFICE VISIT (OUTPATIENT)
Dept: PAIN MEDICINE | Facility: HOSPITAL | Age: 65
End: 2025-01-30
Payer: MEDICARE

## 2025-01-30 VITALS
RESPIRATION RATE: 16 BRPM | OXYGEN SATURATION: 97 % | SYSTOLIC BLOOD PRESSURE: 145 MMHG | DIASTOLIC BLOOD PRESSURE: 88 MMHG | BODY MASS INDEX: 23.69 KG/M2 | HEART RATE: 72 BPM | WEIGHT: 150.9 LBS | HEIGHT: 67 IN

## 2025-01-30 DIAGNOSIS — M54.50 LUMBAR BACK PAIN: ICD-10-CM

## 2025-01-30 DIAGNOSIS — M48.062 SPINAL STENOSIS OF LUMBAR REGION WITH NEUROGENIC CLAUDICATION: Primary | ICD-10-CM

## 2025-01-30 DIAGNOSIS — M47.816 LUMBAR SPONDYLOSIS: ICD-10-CM

## 2025-01-30 DIAGNOSIS — M48.061 SPINAL STENOSIS OF LUMBAR REGION WITHOUT NEUROGENIC CLAUDICATION: ICD-10-CM

## 2025-01-30 PROCEDURE — 99204 OFFICE O/P NEW MOD 45 MIN: CPT | Performed by: PHYSICAL MEDICINE & REHABILITATION

## 2025-01-30 PROCEDURE — 3008F BODY MASS INDEX DOCD: CPT | Performed by: PHYSICAL MEDICINE & REHABILITATION

## 2025-01-30 PROCEDURE — G2211 COMPLEX E/M VISIT ADD ON: HCPCS | Performed by: PHYSICAL MEDICINE & REHABILITATION

## 2025-01-30 PROCEDURE — 1159F MED LIST DOCD IN RCRD: CPT | Performed by: PHYSICAL MEDICINE & REHABILITATION

## 2025-01-30 PROCEDURE — 99214 OFFICE O/P EST MOD 30 MIN: CPT | Performed by: PHYSICAL MEDICINE & REHABILITATION

## 2025-01-30 RX ORDER — DIAZEPAM 5 MG/1
5 TABLET ORAL ONCE AS NEEDED
OUTPATIENT
Start: 2025-01-30

## 2025-01-30 RX ORDER — GABAPENTIN 300 MG/1
CAPSULE ORAL
Qty: 90 CAPSULE | Refills: 2 | Status: SHIPPED | OUTPATIENT
Start: 2025-01-30

## 2025-01-30 ASSESSMENT — COLUMBIA-SUICIDE SEVERITY RATING SCALE - C-SSRS
2. HAVE YOU ACTUALLY HAD ANY THOUGHTS OF KILLING YOURSELF?: NO
6. HAVE YOU EVER DONE ANYTHING, STARTED TO DO ANYTHING, OR PREPARED TO DO ANYTHING TO END YOUR LIFE?: NO
1. IN THE PAST MONTH, HAVE YOU WISHED YOU WERE DEAD OR WISHED YOU COULD GO TO SLEEP AND NOT WAKE UP?: NO

## 2025-01-30 ASSESSMENT — PATIENT HEALTH QUESTIONNAIRE - PHQ9
1. LITTLE INTEREST OR PLEASURE IN DOING THINGS: SEVERAL DAYS
SUM OF ALL RESPONSES TO PHQ9 QUESTIONS 1 AND 2: 1
10. IF YOU CHECKED OFF ANY PROBLEMS, HOW DIFFICULT HAVE THESE PROBLEMS MADE IT FOR YOU TO DO YOUR WORK, TAKE CARE OF THINGS AT HOME, OR GET ALONG WITH OTHER PEOPLE: NOT DIFFICULT AT ALL
2. FEELING DOWN, DEPRESSED OR HOPELESS: NOT AT ALL

## 2025-01-30 ASSESSMENT — ENCOUNTER SYMPTOMS
OCCASIONAL FEELINGS OF UNSTEADINESS: 0
LOSS OF SENSATION IN FEET: 0

## 2025-01-30 NOTE — PROGRESS NOTES
Subjective   Patient ID: Mateo Smith is a 65 y.o. male who presents for Back Pain.  HPI      Patient presents to office for initial eval of lower back pain that he has been experiencing since late Nov 2024 after lifting his dog up onto the bed.  He has had back pain before that but it just got worse in November.  He previously though has gone to a chiropractor for a number of months Patient states that pain radiates down the bilateral legs with numbness and tingling, states that left foot is completely numb. Has PT set up but has yet to participate.  He previously has gone to a chiropractor a few months ago for a number of months which helped some.  Denies any lower extremity weakness or bowel or bladder changes.  Pain is worse with all activities.  He is very active and likes to walk his dog on a daily basis as well as mow his grass.  These are all affected by the back pain.  No prior history of back surgery    Pain Score: 7/10    Injections/Procedures: denies    Neuromodulators/Other Medications: none    Other: positioning, rest, heat/ice    OSWESTRY SCORE:                     Monitoring and compliance:    ORT:    PDUQ:    Office Agreement:      Review of Systems     Current Outpatient Medications   Medication Instructions    lidocaine (Lidoderm) 5 % patch 1 patch, transdermal, Daily, Remove & discard patch within 12 hours or as directed by MD.    methocarbamol (ROBAXIN) 500 mg, oral, 2 times daily        Past Medical History:   Diagnosis Date    Personal history of other diseases of the digestive system     History of gastroesophageal reflux (GERD)    Skull fracture (Multi)         Past Surgical History:   Procedure Laterality Date    OTHER SURGICAL HISTORY  06/27/2019    Cystoscopy    OTHER SURGICAL HISTORY  06/27/2019    Urethral dilation        No family history on file.     No Known Allergies     No results found for this or any previous visit from the past 1000 days.      Objective     Vitals:    01/30/25  1029   BP: 145/88   Pulse: 72   Resp: 16   SpO2: 97%               Physical Exam    GENERAL EXAM  Vital Signs: Vital signs to include heart rate, respiration rate, blood pressure, and temperature were reviewed.  General Appearance:  Awake, alert, healthy appearing, well developed, No acute distress.  Head: Normocephalic without evidence of head injury.  Neck: The appearance of the neck was normal without swelling with a midline trachea.  Eyes: The eyelids and eyebrows exhibited no abnormalities.  Pupils were not pin-point.  Sclera was without icterus.  Lungs: Respiration rhythm and depth was normal.  Respiratory movements were normal without labored breathing.  Cardiovascular: No peripheral edema was present.    Neurological: Patient was oriented to time, place, and person.  Speech was normal.  Balance, gait, and stance were unremarkable.    Psychiatric: Appearance was normal with appropriate dress.  Mood was euthymic and affect was normal.  Skin: Affected regions were without ecchymosis or skin lesions.    Back (MSK/neuro/skin):  Full active and passive range of motion in all planes  Strength 5 out of 5 bilateral lower extremities  Sensation to light-touch grossly intact bilateral lower extremities  Deep tendon reflexes equal bilateral lower extremities  No edema/effusion/erythema  Skin: no skin lesions or scars  B SLR (-)  B sacral spring test (-),   B facet load (-)  B SIJ tenderness (-)   B MINNIE (-)  Full flexion/extension  No TTP, no muscle spasm over lumbar paraspinals    Physical exam as above except:  Strength 4-5 bilateral knee extension.  Decree sensation to light touch over left foot dorsal and plantar aspects.  Tenderness to palpation with significant muscle spasm noted left greater than right lumbar paraspinals.          Assessment/Plan   Diagnoses and all orders for this visit:  Spinal stenosis of lumbar region with neurogenic claudication  -     FL pain management; Future  -     Epidural Steroid  Injection; Future  -     gabapentin (Neurontin) 300 mg capsule; Day 1-5 300mg at bedtime, Day 6-10 600mg at bedtime, Day 11 and after 900mg at bedtime.  Lumbar back pain  -     Referral to Pain Medicine  Spinal stenosis of lumbar region without neurogenic claudication  -     Referral to Pain Medicine  Lumbar spondylosis  -     gabapentin (Neurontin) 300 mg capsule; Day 1-5 300mg at bedtime, Day 6-10 600mg at bedtime, Day 11 and after 900mg at bedtime.  Other orders  -     NPO Diet Except: Sips with meds; Effective now; Standing  -     Height and weight; Standing  -     Insert and maintain peripheral IV; Standing  -     Saline lock IV; Standing  -     Type And Screen; Standing  -     diazePAM (Valium) tablet 5 mg  -     Adult diet Regular; Standing  -     Vital Signs; Standing  -     Notify physician - Standard Parameters; Standing  -     Continue IV fluids ordered pre-procedure; Standing  -     Prior to Discharge O2 Weaning; Standing  -     Pulse oximetry, continuous; Standing  -     Discharge patient; Standing    65-year-old male with lower back pain with left greater than right lower extremity radicular/claudication symptoms.  I did personally review patient's CT scan of his lumbar spine showing degenerative changes with some grade 1 anterolisthesis of L4 and L5 and some ligamentum flavum hypertrophy causing some central canal stenosis at that level as well as foraminal stenosis.  There was some question of the CT scan about a laminectomy on the right however patient has not had any type of back surgery.  He has failed conservative treatment to include NSAIDs as well as he has gone through chiropractic treatment and home exercise through there.  He has not been able to tolerate any of the exercises currently.  He is going to be starting some physical therapy as well but I think it is medically necessary that we move forward with an injection to help him tolerate the therapy as he has been unable to do that at  home.  Plan for today:  - Keep PT appointment and continue home exercise program.  - We will start gabapentin 300 mg at night with titration up to 9 her milligrams as needed and tolerated.  - We will schedule patient for initial L5-S1 interlaminar epidural steroid injection under fluoroscopic guidance.  We discussed the risks, benefits and alternatives to the procedure and the patient would like to proceed.  - Patient will follow-up with my nurse practitioner after the injection.  If he does not have durable relief we will obtain an MRI of his lumbar spine at that point in anticipation of a more targeted transforaminal epidural steroid injection.         This note was generated with the aid of dictation software, there may be typos despite my attempts at proofreading.

## 2025-02-11 ENCOUNTER — EVALUATION (OUTPATIENT)
Dept: PHYSICAL THERAPY | Facility: HOSPITAL | Age: 65
End: 2025-02-11
Payer: MEDICARE

## 2025-02-11 DIAGNOSIS — M54.16 LUMBAR RADICULOPATHY: Primary | ICD-10-CM

## 2025-02-11 DIAGNOSIS — M48.062 SPINAL STENOSIS OF LUMBAR REGION WITH NEUROGENIC CLAUDICATION: ICD-10-CM

## 2025-02-11 DIAGNOSIS — M47.816 LUMBAR SPONDYLOSIS: ICD-10-CM

## 2025-02-11 PROCEDURE — 97110 THERAPEUTIC EXERCISES: CPT | Mod: GP | Performed by: PHYSICAL THERAPIST

## 2025-02-11 PROCEDURE — 97162 PT EVAL MOD COMPLEX 30 MIN: CPT | Mod: GP | Performed by: PHYSICAL THERAPIST

## 2025-02-11 ASSESSMENT — ENCOUNTER SYMPTOMS
OCCASIONAL FEELINGS OF UNSTEADINESS: 0
LOSS OF SENSATION IN FEET: 1
DEPRESSION: 0

## 2025-02-11 ASSESSMENT — PAIN SCALES - GENERAL: PAINLEVEL_OUTOF10: 7

## 2025-02-11 ASSESSMENT — PAIN DESCRIPTION - DESCRIPTORS: DESCRIPTORS: RADIATING

## 2025-02-11 ASSESSMENT — PAIN - FUNCTIONAL ASSESSMENT: PAIN_FUNCTIONAL_ASSESSMENT: 0-10

## 2025-02-11 NOTE — PROGRESS NOTES
"Physical Therapy    Physical Therapy Evaluation and Treatment      Patient Name: Mateo Smith  MRN: [id number]  Today's Date: 2/11/2025  Visit #1    Time Entry:   Time Calculation  Start Time: 1030  Stop Time: 1115  Time Calculation (min): 45 min  PT Evaluation Time Entry  PT Evaluation (Moderate) Time Entry: 30  PT Therapeutic Procedures Time Entry  Therapeutic Exercise Time Entry: 15                   Assessment:  \"Bowen: has chronic back pain since 2023 but in Nov 2024 the pain became radicular in both legs after lifting heavy object.  He has core weakness and decreased posture awareness with some activities. Through therapy he will learn a home exercises program as well as retrain his posture through land based and possibly aquatics physical therapy. In particular he spends a lot of time bent over a table doing puzzles.  Plan to retrain  posture alignment to reduce pain.   There is an anterolisthesis of L4 on 5. degenerative changes with some grade 1 anterolisthesis of L4 and L5 and some ligamentum flavum hypertrophy causing some central canal stenosis at that level as well as foraminal stenosis   PT Assessment  PT Assessment Results: Decreased strength, Decreased range of motion, Decreased mobility, Pain, Orthopedic restrictions     Plan:  OP PT Plan  Treatment/Interventions: Aquatic therapy, Education/ Instruction, Therapeutic exercises, Neuromuscular re-education, Therapeutic activities, Manual therapy  PT Plan: Skilled PT  PT Frequency: 2 times per week  Duration: 6 week  Onset Date: 11/24/24 (ongoiing  since sept 2023)  Certification Period Start Date: 02/11/25  Certification Period End Date: 02/11/25  Number of Treatments Authorized: m+3  Rehab Potential: Good  Plan of Care Agreement: Patient    Current Problem:   1. Lumbar radiculopathy  Referral to Physical Therapy    Follow Up In Physical Therapy      2. Lumbar spondylosis        3. Spinal stenosis of lumbar region with neurogenic claudication      " "      Subjective    LBP since Sept 2023 and it worsened after moving a heavy box in Nov 2024.  No hx of surgery .  Plans to have injection soon.   Pain was 10/10 , now it is 7/10 .    Since Nov he has Left leg numbness to numb intermittent  Rt leg sometimes to foot        General:    General  Reason for Referral: Lumbar radiculopathy  Referred By: YVES Brush  Precautions:  Precautions  STEADI Fall Risk Score (The score of 4 or more indicates an increased risk of falling): 4 ( no falls)       Pain:  Pain Assessment  Pain Assessment: 0-10  0-10 (Numeric) Pain Score: 7 (was 10/10 prior to meds)  Pain Type: Referred pain, Chronic pain  Pain Location: Back  Pain Orientation: Lower  Pain Radiating Towards: rt leg and left leg to toes  Pain Descriptors: Radiating  Pain Frequency: Constant/continuous  Pain Onset: Ongoing              Objective        General Assessments:  \"Bowen\" Mateo Smith is a 65 y.o. male patient presenting with low back pain with radiculopathy bilateral LEs.  His symptoms started around sept 2023 and flared up on Thanksgiving 2024 , he was lifting his dog onto the bed when he felt sharp pain.  He has left greater than right sided low back pain.  His pain radiates diffusely down his legs, left greater than right.  His left foot is numb.  He denies weakness, dragging or tripping over his feet.  He has full control of his bowel and bladder.     Patient states that pain radiates down the bilateral legs with numbness and tingling, states that left foot is completely numb.  He previously has gone to a chiropractor a few months ago for a number of months which helped some.   Pain is worse with all activities.  He likes to walk his dog on a daily basis as well as mow his grass ( push mower) .  He spends a lot of time on hobby of puzzle making and he demo flexed trunk posture with this .  These are all affected by the back pain.  No prior history of back surgery     CT scan of the lumbar spine from 1/2. There " "is no evidence of acute fracture. There is an anterolisthesis of L4 on 5. degenerative changes with some grade 1 anterolisthesis of L4 and L5 and some ligamentum flavum hypertrophy causing some central canal stenosis at that level as well as foraminal stenosis         If he does not have improvement after 6 weeks physical therapy and injections he should follow-up with referring Provider at which they will consider an MRI of the lumbar spine.      Functional Assessments:  Amb with no assist  device  No falls  Meds make lightheaded in am for first  hour  Negotiates stairs with rail  Retired, takes care of property, self propelled mower push mower  Sleeps ok with meds  Hobby : stands and does puzzles - demo posture which is flexed - advise to find neutral alignment       Extremity/Trunk Assessments:    Trunk AROM   Flex to reach mid shin  Ext limited  Side bend WNL  SKC pain with Left at 90 .  Rt 90  Left hip tight with figure 4    MMT LE  Hip flex  5/5 telma  Knee flex 5/5   Knee ext 4/5 telma  Hip abd Left 3+ /5 ,  Rt 3/5    B SLR (-)  B MINNIE + left more so than Rt      No TTP, no muscle spasm over lumbar paraspinals  Decreased sensation to light touch left dorsal and plantar  foot    Outcome Measures:  Oswestry 28 raw = 56%    Treatments:  EXERCISES       Date 2-11-25      VISIT# #1 # # #    REPS REPS REPS REPS   nustep       Calf stretch/ incline  HS stretch       Post pelvic tilt   PPT with dead bugs 10 x 1  5\" H  NP      Hooklying figure 4 30\" x 2      Sidelying hip abd  Clam shell 10 x ea  10 x ea      Prone lying   Prone press up       Scapular retraction                                   Body mechanics and postures with activities - educate X please continue to educate      Pt enjoys puzzles at home / stand posture was flexed /find good alignment or take a break from puzzles X please review again next              Aquatics PT Discuss after he demo some land based HEP progress       HEP [email or web address]         Access " Code: [email or web address]  URL: [email or web address]  Date: 02/11/2025  Prepared by: Lisa Whitmore    Exercises  - Supine Posterior Pelvic Tilt  - 1 x daily - 7 x weekly - 10 reps - 5 hold  - Supine Hip External Rotation Stretch  - 1 x daily - 7 x weekly - 3 sets - 30 hold  - Clamshell  - 1 x daily - 7 x weekly - 10 reps  - Sidelying Hip Abduction  - 1 x daily - 7 x weekly - 3 sets - 10 reps    EDUCATION:  Outpatient Education  Individual(s) Educated: Patient  Education Provided: Home Exercise Program, POC, Posture, Body Mechanics    Goals:  Active       PT Problem       PT Goal       Start:  02/11/25    Expected End:  03/04/25       1 Patient to be independent with home exercises within pain free range to stretch Lower extremities and to strengthen core abdominals and core stabilization to increase mobility  2 Patient to demonstrate understanding of what it means to have neutral posture alignment, the use of positioning strategies and body mechanics principles to reduce pain with everyday activities.  3 Patient to gain the functional range of motion necessary in the  lumbar spine to perform activities of daily living with increased ease.  4 Pt to modify his posture with standing doing puzzles , to use good alignment strategies to reduce lumbar radiculopathy         PT Goal        Start:  02/11/25    Expected End:  03/25/25       1 Patient to have reduced pain by 50% or more for increased function and mobility  2 Patient to demonstrate 1/3 MMT strength gain  in  targeted muscle groups and core strength for increased overall mobility  3 Patient to demonstrate a 4 point or more change in Oswestry to indicate reduced impairment with every day living   4 Patient to return to community mobility and household chores and job related activity with pain levels managed using increased awareness of engaging core stabilization and strategies utilized to manage pain.

## 2025-02-19 ENCOUNTER — TREATMENT (OUTPATIENT)
Dept: PHYSICAL THERAPY | Facility: HOSPITAL | Age: 65
End: 2025-02-19
Payer: MEDICARE

## 2025-02-19 DIAGNOSIS — M54.16 LUMBAR RADICULOPATHY: ICD-10-CM

## 2025-02-19 PROCEDURE — 97110 THERAPEUTIC EXERCISES: CPT | Mod: GP,CQ | Performed by: PHYSICAL THERAPY ASSISTANT

## 2025-02-19 ASSESSMENT — PAIN SCALES - GENERAL: PAINLEVEL_OUTOF10: 7

## 2025-02-19 ASSESSMENT — PAIN DESCRIPTION - DESCRIPTORS: DESCRIPTORS: RADIATING

## 2025-02-19 ASSESSMENT — PAIN - FUNCTIONAL ASSESSMENT: PAIN_FUNCTIONAL_ASSESSMENT: 0-10

## 2025-02-19 NOTE — PROGRESS NOTES
"Physical Therapy    Physical Therapy Treatment    Patient Name: Mateo Smith  MRN: [id number]  : 1960  Today's Date: 2025  Time Calculation  Start Time: 1030  Stop Time: 1115  Time Calculation (min): 45 min    PT Therapeutic Procedures Time Entry  Therapeutic Exercise Time Entry: 45          VISIT:# 2    Current Problem  1. Lumbar radiculopathy  Follow Up In Physical Therapy          Subjective Bowen reported he has not fallen since last visit.  He has been doing HEP. He did an exercises from previous therapy that insisted of cat/camel and they increased his pain.        Precautions  Precautions  STEADI Fall Risk Score (The score of 4 or more indicates an increased risk of falling): 4 ( no falls)       Pain  Pain Assessment: 0-10  0-10 (Numeric) Pain Score: 7  Pain Type: Referred pain  Pain Location: Back  Pain Orientation: Lower  Pain Radiating Towards: left LE to toes  Pain Descriptors: Radiating  Pain Frequency: Constant/continuous  Pain Onset: Ongoing       Objective initiated exercises as per flow sheet.            Treatments:  EXERCISES       Date 25     VISIT# #1 #2 # #    REPS REPS REPS REPS   nustep  L0 x10 '     Calf stretch/ incline  HS stretch  3x30\"  3x30\"     Post pelvic tilt   PPT with dead bugs  Marches  Bridges 10 x 1  5\" H  NP 20x1 10\"H    2x10  x10     Hooklying figure 4 30\" x 2 30\"x2     Sidelying hip abd  Clam shell 10 x ea  10 x ea 2x10 ea  2x10 ea     Prone lying   Prone press up       Scapular retraction  X10 3\"H            Leg press  DLP  SLP  TR                     Body mechanics and postures with activities - educate X please continue to educate educated     Pt enjoys puzzles at home / stand posture was flexed /find good alignment or take a break from puzzles X please review again next  educated            Aquatics PT Discuss after he demo some land based HEP progress       HEP [email or web address]         Access Code: [email or web address]  URL: " [email or web address]  Date: 02/11/2025  Prepared by: Lisa Whitmore    Exercises  - Supine Posterior Pelvic Tilt  - 1 x daily - 7 x weekly - 10 reps - 5 hold  - Supine Hip External Rotation Stretch  - 1 x daily - 7 x weekly - 3 sets - 30 hold  - Clamshell  - 1 x daily - 7 x weekly - 10 reps  - Sidelying Hip Abduction  - 1 x daily - 7 x weekly - 3 sets - 10 reps      Assessment:  PT Assessment  PT Assessment Results: Decreased strength, Decreased range of motion, Decreased mobility, Pain, Orthopedic restrictions  Assessment Comment: pt educated to only perform HEP given  here to eliminate increased pain. pt with some discomfort throughout session. pt reported muscle soreness at end of session. Pain was 7/10.       Plan:  OP PT Plan  Treatment/Interventions: Aquatic therapy, Education/ Instruction, Therapeutic exercises, Neuromuscular re-education, Therapeutic activities, Manual therapy  PT Plan: Skilled PT  PT Frequency: 2 times per week  Duration: 6 week  Onset Date: 11/24/24  Certification Period Start Date: 02/11/25  Certification Period End Date: 02/11/25  Number of Treatments Authorized: m+3  Rehab Potential: Good  Plan of Care Agreement: Patient    Goals:  Active       PT Problem       PT Goal       Start:  02/11/25    Expected End:  03/04/25       1 Patient to be independent with home exercises within pain free range to stretch Lower extremities and to strengthen core abdominals and core stabilization to increase mobility  2 Patient to demonstrate understanding of what it means to have neutral posture alignment, the use of positioning strategies and body mechanics principles to reduce pain with everyday activities.  3 Patient to gain the functional range of motion necessary in the  lumbar spine to perform activities of daily living with increased ease.  4 Pt to modify his posture with standing doing puzzles , to use good alignment strategies to reduce lumbar radiculopathy         PT Goal         Start:  02/11/25    Expected End:  03/25/25       1 Patient to have reduced pain by 50% or more for increased function and mobility  2 Patient to demonstrate 1/3 MMT strength gain  in  targeted muscle groups and core strength for increased overall mobility  3 Patient to demonstrate a 4 point or more change in Oswestry to indicate reduced impairment with every day living   4 Patient to return to community mobility and household chores and job related activity with pain levels managed using increased awareness of engaging core stabilization and strategies utilized to manage pain.

## 2025-02-21 ENCOUNTER — HOSPITAL ENCOUNTER (OUTPATIENT)
Dept: GASTROENTEROLOGY | Facility: HOSPITAL | Age: 65
Discharge: HOME | End: 2025-02-21
Payer: MEDICARE

## 2025-02-21 VITALS
HEART RATE: 69 BPM | TEMPERATURE: 97.4 F | OXYGEN SATURATION: 99 % | RESPIRATION RATE: 16 BRPM | SYSTOLIC BLOOD PRESSURE: 134 MMHG | DIASTOLIC BLOOD PRESSURE: 85 MMHG

## 2025-02-21 DIAGNOSIS — M48.062 SPINAL STENOSIS OF LUMBAR REGION WITH NEUROGENIC CLAUDICATION: ICD-10-CM

## 2025-02-21 PROCEDURE — 2550000001 HC RX 255 CONTRASTS: Performed by: PHYSICAL MEDICINE & REHABILITATION

## 2025-02-21 PROCEDURE — 62323 NJX INTERLAMINAR LMBR/SAC: CPT | Performed by: PHYSICAL MEDICINE & REHABILITATION

## 2025-02-21 PROCEDURE — 2500000004 HC RX 250 GENERAL PHARMACY W/ HCPCS (ALT 636 FOR OP/ED): Mod: JZ | Performed by: PHYSICAL MEDICINE & REHABILITATION

## 2025-02-21 RX ORDER — LIDOCAINE HYDROCHLORIDE 5 MG/ML
INJECTION, SOLUTION INFILTRATION; INTRAVENOUS AS NEEDED
Status: COMPLETED | OUTPATIENT
Start: 2025-02-21 | End: 2025-02-21

## 2025-02-21 RX ORDER — DIAZEPAM 5 MG/1
5 TABLET ORAL ONCE AS NEEDED
Status: DISCONTINUED | OUTPATIENT
Start: 2025-02-21 | End: 2025-02-22 | Stop reason: HOSPADM

## 2025-02-21 RX ORDER — METHYLPREDNISOLONE ACETATE 40 MG/ML
INJECTION, SUSPENSION INTRA-ARTICULAR; INTRALESIONAL; INTRAMUSCULAR; SOFT TISSUE AS NEEDED
Status: COMPLETED | OUTPATIENT
Start: 2025-02-21 | End: 2025-02-21

## 2025-02-21 RX ADMIN — IOHEXOL 5 ML: 350 INJECTION, SOLUTION INTRAVENOUS at 10:42

## 2025-02-21 RX ADMIN — LIDOCAINE HYDROCHLORIDE 15 ML: 5 INJECTION, SOLUTION INFILTRATION at 10:39

## 2025-02-21 RX ADMIN — METHYLPREDNISOLONE ACETATE 40 MG: 40 INJECTION, SUSPENSION INTRA-ARTICULAR; INTRALESIONAL; INTRAMUSCULAR; SOFT TISSUE at 10:42

## 2025-02-21 ASSESSMENT — PAIN - FUNCTIONAL ASSESSMENT
PAIN_FUNCTIONAL_ASSESSMENT: 0-10
PAIN_FUNCTIONAL_ASSESSMENT: 0-10

## 2025-02-21 ASSESSMENT — PAIN SCALES - GENERAL
PAINLEVEL_OUTOF10: 5 - MODERATE PAIN
PAINLEVEL_OUTOF10: 0 - NO PAIN

## 2025-02-21 NOTE — H&P
HISTORY AND PHYSICAL    History Of Present Illness  Mateo Smith is a 65 y.o. male presenting with chronic pain here for procedure as stated below. Patient denies any changes to health since the last visit to our clinic.    Past Medical History  Past Medical History:   Diagnosis Date    Personal history of other diseases of the digestive system     History of gastroesophageal reflux (GERD)    Skull fracture (Multi)        Surgical History  Past Surgical History:   Procedure Laterality Date    OTHER SURGICAL HISTORY  06/27/2019    Cystoscopy    OTHER SURGICAL HISTORY  06/27/2019    Urethral dilation        Social History  He reports that he has been smoking cigarettes. He does not have any smokeless tobacco history on file. He reports that he does not currently use alcohol. He reports that he does not currently use drugs.    Family History  No family history on file.     Allergies  Patient has no known allergies.    Review of Systems  12 point ROS done and negative except for the above.     Physical Exam  General: NAD, well groomed, well nourished  Eyes: Non-icteric sclera, EOMI  Ears, Nose, Mouth, and Throat: External ears and nose appear to be without deformity or rash. No lesions or masses noted. Hearing is grossly intact.   Neck: Trachea midline  Respiratory: Nonlabored breathing   Cardiovascular: No peripheral edema   Skin: No rashes or open lesions/ulcers identified on skin.      Last Recorded Vitals  There were no vitals taken for this visit.    Relevant Results  Current Outpatient Medications   Medication Instructions    gabapentin (Neurontin) 300 mg capsule Day 1-5 300mg at bedtime, Day 6-10 600mg at bedtime, Day 11 and after 900mg at bedtime.    lidocaine (Lidoderm) 5 % patch 1 patch, transdermal, Daily, Remove & discard patch within 12 hours or as directed by MD.    methocarbamol (ROBAXIN) 500 mg, oral, 2 times daily       No results found for this or any previous visit from the past 1000 days.     No  image results found.     No diagnosis found.        Assessment/Plan   Mateo Smith is a 65 y.o. male presenting with chronic pain here for Lumbar interlaminar epidural steroid injection at L5-S1 level; he denies any recent antibiotic use or infections, he denies any blood thinner use , and he denies contrast or local anesthetic allergies.    ASA PS Classification: 2  Mallampati score: 2    Plan:  - We will proceed with the procedure as above. We discussed extensively the risks, benefits, and alternatives to the procedure. The patient's questions were addressed and answered in detail. The patient demonstrated understanding of the procedure, and is amenable to proceeding with it. The Risks of the procedure that were discussed with the patient include but are not limited to the following: A lack of efficacy, transient worsening of pain, bleeding, infection, nerve injury, nerve damage, neuritis or sunburn sensation. Informed consent obtained as attached to EMR.  - Patient to continue physician directed home exercises as tolerated.  - Patient will follow-up with us in clinic.      Caridad Bond MD  Chronic Pain Fellow  [facility]

## 2025-02-21 NOTE — DISCHARGE INSTRUCTIONS
You had a pain management procedure today.    Observe/ monitor for the following signs & symptoms:  If you notice Excessive bleeding (slow general oozing that completely soaks the dressing, or fresh bright red bleeding).   In either case, apply pressure to the area, elevate it if possible & call your doctor at once.    Also observe for:  Change in color  Numbness/tingling  Coldness to the touch  Swelling  Drainage  Temperature of 101.5 or higher.  Increased, uncontrollable pain.    *If you notice the above signs & symptoms, please call your doctor right away!*      Discharge Instructions:    Your pain may not be gone immediately after this procedure; it generally takes 3 to 5 days for the steroid to work though can take up to 14 days.   Keep the needle site clean & dry for 24 hours.  Continue your present medications.  Make an appointment to see your doctor in 2-3 weeks.  If any problems occur, or if you have any further questions, please call as soon as possible. If you find that you cannot reach your doctor, but feel that the condition nees a doctor's attention, go to the closest emergency department & take this discharge paper with you.       Dr. Duque's Office: [phone]

## 2025-02-26 ENCOUNTER — TREATMENT (OUTPATIENT)
Dept: PHYSICAL THERAPY | Facility: HOSPITAL | Age: 65
End: 2025-02-26
Payer: MEDICARE

## 2025-02-26 DIAGNOSIS — M54.16 LUMBAR RADICULOPATHY: Primary | ICD-10-CM

## 2025-02-26 DIAGNOSIS — M48.062 SPINAL STENOSIS OF LUMBAR REGION WITH NEUROGENIC CLAUDICATION: ICD-10-CM

## 2025-02-26 DIAGNOSIS — M47.816 LUMBAR SPONDYLOSIS: ICD-10-CM

## 2025-02-26 PROCEDURE — 97110 THERAPEUTIC EXERCISES: CPT | Mod: GP | Performed by: PHYSICAL THERAPIST

## 2025-02-26 ASSESSMENT — PAIN SCALES - GENERAL: PAINLEVEL_OUTOF10: 5 - MODERATE PAIN

## 2025-02-26 ASSESSMENT — PAIN DESCRIPTION - DESCRIPTORS: DESCRIPTORS: RADIATING;NUMBNESS

## 2025-02-26 NOTE — PROGRESS NOTES
"Physical Therapy    Physical Therapy Treatment      Patient Name: Mateo Smith  MRN: [id number]  : 1960   Today's Date: 2025  Visit # 3  Time Calculation  Start Time: 1015  Stop Time: 1115  Time Calculation (min): 60 min    PT Therapeutic Procedures Time Entry  Therapeutic Exercise Time Entry: 55                 Current Problem  Problem List Items Addressed This Visit             ICD-10-CM    Spinal stenosis of lumbar region with neurogenic claudication M48.062    Lumbar spondylosis M47.816    Lumbar radiculopathy - Primary M54.16        Subjective    5/10 LBP . Continues with constant  left LE radicular to foot.numb left foot.    Rt foot partially numb intermittent   injection  w/ no reported relief    Pt had a question re lumbar CT impressions and the comment of  possible cyst on kidney -    PT communicated with referring provider who suggests he  reestablish care with a PCP. Pt in agreement    Since Nov he has Left leg numbness to numb intermittent  Rt leg sometimes to foot    He states he is working on his posture alignment and is doing his home ex to strengthen core .  PT advises he only do the exercises from this PT episode and discontinue exercises from any previous episodes for his back . He agrees.    Precautions  Precautions  STEADI Fall Risk Score (The score of 4 or more indicates an increased risk of falling): 4 ( no falls)  Precautions Comment: lumbar spine spondylolisthesis    Pain:  Pain Assessment  0-10 (Numeric) Pain Score: 5 - Moderate pain  Pain Type: Referred pain  Pain Location: Back  Pain Orientation: Lower  Pain Radiating Towards: left LE to toes, Rt foot  Pain Descriptors: Radiating, Numbness  Pain Frequency: Constant/continuous  Pain Onset: Ongoing      Objective        Name and  confirmed  Progressed exercises and education for spine precautions / spondylolisthesis     \"Bowen: has chronic back pain since  but in 2024 the pain became radicular in both legs after lifting " heavy object. He has core weakness and decreased posture awareness with some activities. Through therapy he will learn a home exercises program as well as retrain his posture through land based and possibly aquatics physical therapy. In particular he spends a lot of time bent over a table doing puzzles.  Plan to retrain  posture alignment to reduce pain.   There is an anterolisthesis of L4 on 5. degenerative changes with some grade 1 anterolisthesis of L4 and L5 and some ligamentum flavum hypertrophy causing some central canal stenosis at that level as well as foraminal stenosis     If he does not have improvement after 6 weeks physical therapy and injections he should follow-up with referring Provider at which they will consider an MRI of the lumbar spine.      Functional Assessments:  Amb with no assist  device  No falls  Meds make lightheaded in am for first  hour - advised pt to discuss this with his MD . He does not have a PCP  Negotiates stairs with rail  Retired, takes care of property, self propelled mower push mower  Sleeps ok with meds  Hobby : stands and does puzzles - demo posture which is flexed - advise to find neutral alignment - good compliance with this        Extremity/Trunk Assessments:     Trunk AROM   Flex to reach mid shin  Ext limited  Side bend WNL  SKC pain with Left at 90 .  Rt 90  Left hip tight with figure 4  Rt hip IR in hooklying, pain left LB     MMT LE  Hip flex  5/5 telma  Knee flex 5/5   Knee ext 4/5 telma  Hip abd Left 3+ /5 ,  Rt 3/5     B SLR (-)  B MINNIE + left more so than Rt        No TTP, no muscle spasm over lumbar paraspinals  Decreased sensation to light touch left dorsal and plantar  foot    Degenerative changes of the lumbosacral spine most pronounced at L4-5  with grade 1 spondylolisthesis.  There is spinal stenosis as well as  narrowing of the neural foramina with effacement of the exiting nerve  roots bilaterally.       Outcome Measures:  Oswestry 28 raw =  "56%      Treatments:  EXERCISES           Date 2-11-25 2/19/25 2/26/25     VISIT# #1 #2 #3 #     REPS REPS REPS REPS   nustep   L0 x10 '  L0 x 10 '     Calf stretch/ incline  HS stretch sit   3x30\"  3x30\"  3x30\"  3x30\"     Post pelvic tilt   PPT with dead bugs  Marches   10 x 1  5\" H  NP 20x1 10\"H     2x10  x10  20 x 1  5\"H         Hooklying figure 4 30\" x 2 30\"x2  30\" x 2 ea     Sidelying hip abd  Clam shell 10 x ea  10 x ea 2x10 ea  2x10 ea    10 x ea     Prone lying         1 min  No relief  DC this       Scapular retraction   X10 3\"H  10 x 5\"H      Post pelvic tilt with wall quarter squat      next     Leg press  DLP  SLP  TR      w/ core cue  4B 10 x 2  3B 10 x 1 ea                               Body mechanics and postures with activities - educate X please continue to educate educated  educate  Good compliance     Pt enjoys puzzles at home / stand posture was flexed /find good alignment or take a break from puzzles X please review again next  educated  educated    Educated in spondylolisthesis precautions        Manual to Lumbar           Aquatics PT Discuss after he demo some land based HEP progress     declines aquatics.   PT 1 x week due to financial  choice     HEP [email or web address]    [email or web address]        Access Code: [email or web address]  URL: [email or web address]    Exercises  - Supine Posterior Pelvic Tilt  - 1 x daily - 7 x weekly - 10 reps - 5 hold  - Supine Hip External Rotation Stretch  - 1 x daily - 7 x weekly - 3 sets - 30 hold  - Clamshell  - 1 x daily - 7 x weekly - 10 reps  - Sidelying Hip Abduction  - 1 x daily - 7 x weekly - 3 sets - 10 reps  - Seated Hamstring Stretch  - 1 x daily - 7 x weekly - 3 sets - 30 hold  - Seated Scapular Retraction  - 1 x daily - 7 x weekly - 10 reps - 5 hold         Assessment:  Injection did not provide relief. Pt has questions about his lumbar CT re comment about cyst on kidney and the comment about laminectomy as he states he never had surgery. PT messaged B " Gonsalo with his question.  pt has intermittent dizziness , does not have a PCP since they  retired , advise pt to call  to get a PCP replacement.      Plan:  pt prefers to do PT 1 x week rather than 2x due to financial. He does home ex daily now and is tolerating well.  April 2 next pain management appt.     Goals:  Active       PT Problem       PT Goal       Start:  02/11/25    Expected End:  03/04/25       1 Patient to be independent with home exercises within pain free range to stretch Lower extremities and to strengthen core abdominals and core stabilization to increase mobility  2 Patient to demonstrate understanding of what it means to have neutral posture alignment, the use of positioning strategies and body mechanics principles to reduce pain with everyday activities.  3 Patient to gain the functional range of motion necessary in the  lumbar spine to perform activities of daily living with increased ease.  4 Pt to modify his posture with standing doing puzzles , to use good alignment strategies to reduce lumbar radiculopathy         PT Goal        Start:  02/11/25    Expected End:  03/25/25       1 Patient to have reduced pain by 50% or more for increased function and mobility  2 Patient to demonstrate 1/3 MMT strength gain  in  targeted muscle groups and core strength for increased overall mobility  3 Patient to demonstrate a 4 point or more change in Oswestry to indicate reduced impairment with every day living   4 Patient to return to community mobility and household chores and job related activity with pain levels managed using increased awareness of engaging core stabilization and strategies utilized to manage pain.

## 2025-03-05 ENCOUNTER — TREATMENT (OUTPATIENT)
Dept: PHYSICAL THERAPY | Facility: HOSPITAL | Age: 65
End: 2025-03-05
Payer: MEDICARE

## 2025-03-05 DIAGNOSIS — M54.16 LUMBAR RADICULOPATHY: ICD-10-CM

## 2025-03-05 PROCEDURE — 97110 THERAPEUTIC EXERCISES: CPT | Mod: GP,CQ

## 2025-03-05 NOTE — PROGRESS NOTES
"Physical Therapy    Physical Therapy Treatment    Patient Name: Mateo Smith  MRN: [id number]  : 1960   Today's Date: 3/5/2025  Time Calculation  Start Time: 925  Stop Time:   Time Calculation (min): 45 min  Visit #4    PT Therapeutic Procedures Time Entry  Therapeutic Exercise Time Entry: 45          Current Problem  1. Lumbar radiculopathy  Follow Up In Physical Therapy            Subjective   Pt states pain is at a constant 6/10. Stating he has been doing HEP but doesn't seem to make pain any better.        Precautions   STEADI Fall Risk Score (The score of 4 or more indicates an increased risk of falling): 4 ( no falls)  Precautions Comment: lumbar spine spondylolisthesis       Pain   6/10    Objective    See flow sheet       Treatments:  EXERCISES           Date 2-11-25 2/19/25  2/26/25  3/5/25   VISIT# #1 #2 #3 #4     REPS REPS REPS REPS   nustep   L0 x10 '  L0 x 10 '  L0 x 10 '   Calf stretch/ incline  HS stretch sit   3x30\"  3x30\"  3x30\"  3x30\"  3x30\"  3x30\"   Post pelvic tilt   PPT with dead bugs  Marches   10 x 1  5\" H  NP 20x1 10\"H     2x10  x10  20 x 1  5\"H         Hooklying figure 4 30\" x 2 30\"x2  30\" x 2 ea  HEP   Sidelying hip abd  Clam shell 10 x ea  10 x ea 2x10 ea  2x10 ea    10 x ea    HEP   Prone lying         1 min  No relief  DC this       Scapular retraction   X10 3\"H  10 x 5\"H   HEP   Post pelvic tilt with wall quarter squat      next  x10   Leg press  DLP  SLP  TR      w/ core cue  4B 10 x 2  3B 10 x 1 ea    w/ core cue  4B 10 x 2  3B 10 x 2 ea          3-way flex    2 x 10   abd    2x 10    ext       2 x 10               Body mechanics and postures with activities - educate X please continue to educate educated  educate  Good compliance     Pt enjoys puzzles at home / stand posture was flexed /find good alignment or take a break from puzzles X please review again next  educated  educated    Educated in spondylolisthesis precautions        Manual to Lumbar           Aquatics PT " Discuss after he demo some land based HEP progress     declines aquatics.   PT 1 x week due to financial  choice     HEP [email or web address]    [email or web address]  [email or web address]      Access Code: [email or web address]  URL: [email or web address]    Exercises  - Supine Posterior Pelvic Tilt  - 1 x daily - 7 x weekly - 10 reps - 5 hold  - Supine Hip External Rotation Stretch  - 1 x daily - 7 x weekly - 3 sets - 30 hold  - Clamshell  - 1 x daily - 7 x weekly - 10 reps  - Sidelying Hip Abduction  - 1 x daily - 7 x weekly - 3 sets - 10 reps  - Seated Hamstring Stretch  - 1 x daily - 7 x weekly - 3 sets - 30 hold  - Seated Scapular Retraction  - 1 x daily - 7 x weekly - 10 reps - 5 hold    3-5-25  Access Code: [email or web address]  URL: [email or web address]  Date: 03/05/2025  Prepared by: Lawanda Moody    Exercises  - Standing March with Counter Support  - 1 x daily - 7 x weekly - 2 sets - 10 reps  - Standing Hip Abduction with Counter Support  - 1 x daily - 7 x weekly - 2 sets - 10 reps  - Standing Hip Extension with Counter Support  - 1 x daily - 7 x weekly - 2 sets - 10 reps  Assessment:  Pt tolerated added exercises with no increased pain. Cues to engage core with exercises. Pt states he has been doing other exercises at home daily.        Plan:   pt prefers to do PT 1 x week rather than 2x due to financial. He does home ex daily now and is tolerating well.  April 2 next pain management appt.     OP EDUCATION:       Goals:  Active       PT Problem       PT Goal       Start:  02/11/25    Expected End:  03/04/25       1 Patient to be independent with home exercises within pain free range to stretch Lower extremities and to strengthen core abdominals and core stabilization to increase mobility  2 Patient to demonstrate understanding of what it means to have neutral posture alignment, the use of positioning strategies and body mechanics principles to reduce pain with everyday activities.  3 Patient to gain the functional  range of motion necessary in the  lumbar spine to perform activities of daily living with increased ease.  4 Pt to modify his posture with standing doing puzzles , to use good alignment strategies to reduce lumbar radiculopathy         PT Goal        Start:  02/11/25    Expected End:  03/25/25       1 Patient to have reduced pain by 50% or more for increased function and mobility  2 Patient to demonstrate 1/3 MMT strength gain  in  targeted muscle groups and core strength for increased overall mobility  3 Patient to demonstrate a 4 point or more change in Oswestry to indicate reduced impairment with every day living   4 Patient to return to community mobility and household chores and job related activity with pain levels managed using increased awareness of engaging core stabilization and strategies utilized to manage pain.

## 2025-03-13 ENCOUNTER — TREATMENT (OUTPATIENT)
Dept: PHYSICAL THERAPY | Facility: HOSPITAL | Age: 65
End: 2025-03-13
Payer: MEDICARE

## 2025-03-13 DIAGNOSIS — M54.16 LUMBAR RADICULOPATHY: Primary | ICD-10-CM

## 2025-03-13 DIAGNOSIS — M47.816 LUMBAR SPONDYLOSIS: ICD-10-CM

## 2025-03-13 DIAGNOSIS — M48.062 SPINAL STENOSIS OF LUMBAR REGION WITH NEUROGENIC CLAUDICATION: ICD-10-CM

## 2025-03-13 PROCEDURE — 97140 MANUAL THERAPY 1/> REGIONS: CPT | Mod: GP | Performed by: PHYSICAL THERAPIST

## 2025-03-13 PROCEDURE — 97110 THERAPEUTIC EXERCISES: CPT | Mod: GP | Performed by: PHYSICAL THERAPIST

## 2025-03-13 ASSESSMENT — PAIN SCALES - GENERAL: PAINLEVEL_OUTOF10: 8

## 2025-03-13 ASSESSMENT — PAIN DESCRIPTION - DESCRIPTORS: DESCRIPTORS: RADIATING;NUMBNESS

## 2025-03-13 NOTE — PROGRESS NOTES
Physical Therapy    Physical Therapy Treatment      Patient Name: Mateo Smith  MRN: [id number]  : 1960   Today's Date: 3/13/2025  Visit # 5  Time Calculation  Start Time: 0800  Stop Time: 0847  Time Calculation (min): 47 min    PT Therapeutic Procedures Time Entry  Manual Therapy Time Entry: 10  Therapeutic Exercise Time Entry: 37                  Current Problem  Problem List Items Addressed This Visit             ICD-10-CM    Spinal stenosis of lumbar region with neurogenic claudication M48.062    Lumbar spondylosis M47.816    Lumbar radiculopathy - Primary M54.16        Subjective    Pain is 8/10 low back  Continues left foot numbness, through the day it is pain /numb  No symptoms in Rt LE  Pt does not think he can do much  ex today and so we did more manual  And education and plan to do aquatics PT next visit for decompression.  Pain increased on Thurs    Precautions  Precautions  STEADI Fall Risk Score (The score of 4 or more indicates an increased risk of falling): 4 ( no falls)  Precautions Comment: lumbar spine spondylolisthesis    Pain:  Pain Assessment  0-10 (Numeric) Pain Score: 8  Pain Type: Referred pain  Pain Location: Back  Pain Orientation: Lower  Pain Descriptors: Radiating, Numbness  Pain Frequency: Constant/continuous  Pain Onset: Ongoing      Objective         Continue to Educate re lumbar stenosis and radiculopathy  STM manual to left glute piriformis and lumbar  Declines estim option - states he had at a different episode of PT and it was painful  Agrees to try aquatics next visit for decompression and core ex    Pronelying 2 min resolved Rt foot numbness,   left foot numbness remains  pronelying increases low back pain.  Supine on mat is not comfortable.  Pain worsens with walking and standing and has antalgic gait .  Plan Aquatics PT and pt may contact his provider re his questions of his continued back pain and LE numbness.  Pt states he is hoping to have a MRI authorized and he had  "to do PT first, he has completed       Bowen: has chronic back pain since 2023 but in Nov 2024 the pain became radicular in both legs after lifting heavy object.  He has core weakness and decreased posture awareness with some activities. Through therapy he will learn a home exercises program as well as retrain his posture through land based and possibly aquatics physical therapy.    There is an anterolisthesis of L4 on 5. degenerative changes with some grade 1 anterolisthesis of L4 and L5 and some ligamentum flavum hypertrophy causing some central canal stenosis at that level as well as foraminal stenosis           Treatments:  EXERCISES            Date 2-11-25 2/19/25  2/26/25  3/5/25 3/13/25   VISIT# #1 #2 #3 #4 #5     REPS REPS REPS REPS    nustep   L0 x10 '  L0 x 10 '  L0 x 10 ' NP   Calf stretch/ incline  HS stretch sit   3x30\"  3x30\"  3x30\"  3x30\"  3x30\"  3x30\" NP   Post pelvic tilt   PPT with dead bugs  Marches    10 x 1  5\" H  NP 20x1 10\"H     2x10  x10  20 x 1  5\"H         attempt in sidelying 10 x   Hooklying figure 4 30\" x 2 30\"x2  30\" x 2 ea  HEP    Sidelying hip abd  Clam shell 10 x ea  10 x ea 2x10 ea  2x10 ea    10 x ea    HEP Sidelying manual to left glute   Prone lying          1 min  No relief  DC this       2 min and this reduced Rt foot numbness     Scapular retraction   X10 3\"H  10 x 5\"H   HEP HEP   Post pelvic tilt with wall quarter squat      next  x10 X 10    Leg press  DLP  SLP  TR      w/ core cue  4B 10 x 2  3B 10 x 1 ea     w/ core cue  4B 10 x 2  3B 10 x 2 ea Hold off    march         10 x 2   3-way flex       2 x 10 10 x 1   abd       2x 10     ext       2 x 10                 Body mechanics and postures with activities - educate X please continue to educate educated  educate  Good compliance      Pt enjoys puzzles at home / stand posture was flexed /find good alignment or take a break from puzzles X please review again next  educated  educated     Educated in spondylolisthesis " precautions     Educated re condition    Manual to Lumbar  And gluteal         30 min   Aquatics PT Discuss after he demo some land based HEP progress     declines aquatics.   PT 1 x week due to financial  choice  l consult with aquatics PT Bary and orient to pool   HEP [email or web address]    [email or web address]  [email or web address]       Access Code: [email or web address]  URL: [email or web address]     Exercises  - Supine Posterior Pelvic Tilt  - 1 x daily - 7 x weekly - 10 reps - 5 hold  - Supine Hip External Rotation Stretch  - 1 x daily - 7 x weekly - 3 sets - 30 hold  - Clamshell  - 1 x daily - 7 x weekly - 10 reps  - Sidelying Hip Abduction  - 1 x daily - 7 x weekly - 3 sets - 10 reps  - Seated Hamstring Stretch  - 1 x daily - 7 x weekly - 3 sets - 30 hold  - Seated Scapular Retraction  - 1 x daily - 7 x weekly - 10 reps - 5 hold     3-5-25  Access Code: [email or web address]  URL: [email or web address]  Date: 03/05/2025  Prepared by: Lawanda Moody     Exercises  - Standing March with Counter Support  - 1 x daily - 7 x weekly - 2 sets - 10 reps  - Standing Hip Abduction with Counter Support  - 1 x daily - 7 x weekly - 2 sets - 10 reps  - Standing Hip Extension with Counter Support  - 1 x daily - 7 x weekly - 2 sets - 10 reps    Assessment:  Pt tolerated added exercises with no increased pain. Cues to engage core with exercises. Pt states he has been doing other exercises at home daily.         Assessment: pt has had hx of injection without relief and has done 5 PT ,  pain limits his exercise and activity tolerance and so we will try aquatics PT for decompression and core stabilization exercise .  Today performed STM and manual .  Pt will follow up with  pain management 4-2-25.  He may contact provider with his questions and he is asking for MRI    Plan:  4-2-25 Pain management.  Do aquatics PT decompression and core stabilization.      Goals:  Active       PT Problem       PT Goal       Start:  02/11/25    Expected End:   03/04/25       1 Patient to be independent with home exercises within pain free range to stretch Lower extremities and to strengthen core abdominals and core stabilization to increase mobility  2 Patient to demonstrate understanding of what it means to have neutral posture alignment, the use of positioning strategies and body mechanics principles to reduce pain with everyday activities.  3 Patient to gain the functional range of motion necessary in the  lumbar spine to perform activities of daily living with increased ease.  4 Pt to modify his posture with standing doing puzzles , to use good alignment strategies to reduce lumbar radiculopathy         PT Goal        Start:  02/11/25    Expected End:  03/25/25       1 Patient to have reduced pain by 50% or more for increased function and mobility  2 Patient to demonstrate 1/3 MMT strength gain  in  targeted muscle groups and core strength for increased overall mobility  3 Patient to demonstrate a 4 point or more change in Oswestry to indicate reduced impairment with every day living   4 Patient to return to community mobility and household chores and job related activity with pain levels managed using increased awareness of engaging core stabilization and strategies utilized to manage pain.

## 2025-03-17 ENCOUNTER — APPOINTMENT (OUTPATIENT)
Dept: CARDIOLOGY | Facility: HOSPITAL | Age: 65
End: 2025-03-17
Payer: MEDICARE

## 2025-03-17 ENCOUNTER — HOSPITAL ENCOUNTER (EMERGENCY)
Facility: HOSPITAL | Age: 65
Discharge: HOME | End: 2025-03-17
Attending: STUDENT IN AN ORGANIZED HEALTH CARE EDUCATION/TRAINING PROGRAM
Payer: MEDICARE

## 2025-03-17 ENCOUNTER — APPOINTMENT (OUTPATIENT)
Dept: RADIOLOGY | Facility: HOSPITAL | Age: 65
End: 2025-03-17
Payer: MEDICARE

## 2025-03-17 VITALS
BODY MASS INDEX: 23.54 KG/M2 | HEIGHT: 67 IN | WEIGHT: 150 LBS | RESPIRATION RATE: 20 BRPM | SYSTOLIC BLOOD PRESSURE: 143 MMHG | OXYGEN SATURATION: 98 % | HEART RATE: 63 BPM | TEMPERATURE: 98.4 F | DIASTOLIC BLOOD PRESSURE: 84 MMHG

## 2025-03-17 DIAGNOSIS — R07.9 ACUTE CHEST PAIN: Primary | ICD-10-CM

## 2025-03-17 LAB
ALBUMIN SERPL BCP-MCNC: 4.6 G/DL (ref 3.4–5)
ALP SERPL-CCNC: 67 U/L (ref 33–136)
ALT SERPL W P-5'-P-CCNC: 15 U/L (ref 10–52)
ANION GAP SERPL CALC-SCNC: 13 MMOL/L (ref 10–20)
AST SERPL W P-5'-P-CCNC: 20 U/L (ref 9–39)
BASOPHILS # BLD AUTO: 0.01 X10*3/UL (ref 0–0.1)
BASOPHILS NFR BLD AUTO: 0.2 %
BILIRUB SERPL-MCNC: 1 MG/DL (ref 0–1.2)
BUN SERPL-MCNC: 10 MG/DL (ref 6–23)
CALCIUM SERPL-MCNC: 9.9 MG/DL (ref 8.6–10.3)
CARDIAC TROPONIN I PNL SERPL HS: 7 NG/L (ref 0–20)
CARDIAC TROPONIN I PNL SERPL HS: 7 NG/L (ref 0–20)
CHLORIDE SERPL-SCNC: 108 MMOL/L (ref 98–107)
CO2 SERPL-SCNC: 25 MMOL/L (ref 21–32)
CREAT SERPL-MCNC: 0.79 MG/DL (ref 0.5–1.3)
EGFRCR SERPLBLD CKD-EPI 2021: >90 ML/MIN/1.73M*2
EOSINOPHIL # BLD AUTO: 0 X10*3/UL (ref 0–0.7)
EOSINOPHIL NFR BLD AUTO: 0 %
ERYTHROCYTE [DISTWIDTH] IN BLOOD BY AUTOMATED COUNT: 13.3 % (ref 11.5–14.5)
GLUCOSE SERPL-MCNC: 87 MG/DL (ref 74–99)
HCT VFR BLD AUTO: 41.8 % (ref 41–52)
HGB BLD-MCNC: 13.8 G/DL (ref 13.5–17.5)
IMM GRANULOCYTES # BLD AUTO: 0.01 X10*3/UL (ref 0–0.7)
IMM GRANULOCYTES NFR BLD AUTO: 0.2 % (ref 0–0.9)
LYMPHOCYTES # BLD AUTO: 1.38 X10*3/UL (ref 1.2–4.8)
LYMPHOCYTES NFR BLD AUTO: 28.6 %
MAGNESIUM SERPL-MCNC: 1.85 MG/DL (ref 1.6–2.4)
MCH RBC QN AUTO: 29.8 PG (ref 26–34)
MCHC RBC AUTO-ENTMCNC: 33 G/DL (ref 32–36)
MCV RBC AUTO: 90 FL (ref 80–100)
MONOCYTES # BLD AUTO: 0.55 X10*3/UL (ref 0.1–1)
MONOCYTES NFR BLD AUTO: 11.4 %
NEUTROPHILS # BLD AUTO: 2.87 X10*3/UL (ref 1.2–7.7)
NEUTROPHILS NFR BLD AUTO: 59.6 %
NRBC BLD-RTO: 0 /100 WBCS (ref 0–0)
PLATELET # BLD AUTO: 155 X10*3/UL (ref 150–450)
POTASSIUM SERPL-SCNC: 4.4 MMOL/L (ref 3.5–5.3)
PROT SERPL-MCNC: 7.2 G/DL (ref 6.4–8.2)
RBC # BLD AUTO: 4.63 X10*6/UL (ref 4.5–5.9)
SODIUM SERPL-SCNC: 142 MMOL/L (ref 136–145)
WBC # BLD AUTO: 4.8 X10*3/UL (ref 4.4–11.3)

## 2025-03-17 PROCEDURE — 71046 X-RAY EXAM CHEST 2 VIEWS: CPT

## 2025-03-17 PROCEDURE — 93005 ELECTROCARDIOGRAM TRACING: CPT

## 2025-03-17 PROCEDURE — 80053 COMPREHEN METABOLIC PANEL: CPT | Performed by: STUDENT IN AN ORGANIZED HEALTH CARE EDUCATION/TRAINING PROGRAM

## 2025-03-17 PROCEDURE — 84484 ASSAY OF TROPONIN QUANT: CPT | Performed by: STUDENT IN AN ORGANIZED HEALTH CARE EDUCATION/TRAINING PROGRAM

## 2025-03-17 PROCEDURE — 71275 CT ANGIOGRAPHY CHEST: CPT

## 2025-03-17 PROCEDURE — 36415 COLL VENOUS BLD VENIPUNCTURE: CPT | Performed by: STUDENT IN AN ORGANIZED HEALTH CARE EDUCATION/TRAINING PROGRAM

## 2025-03-17 PROCEDURE — 71046 X-RAY EXAM CHEST 2 VIEWS: CPT | Performed by: RADIOLOGY

## 2025-03-17 PROCEDURE — 99285 EMERGENCY DEPT VISIT HI MDM: CPT | Mod: 25 | Performed by: STUDENT IN AN ORGANIZED HEALTH CARE EDUCATION/TRAINING PROGRAM

## 2025-03-17 PROCEDURE — 2500000004 HC RX 250 GENERAL PHARMACY W/ HCPCS (ALT 636 FOR OP/ED): Performed by: STUDENT IN AN ORGANIZED HEALTH CARE EDUCATION/TRAINING PROGRAM

## 2025-03-17 PROCEDURE — 83735 ASSAY OF MAGNESIUM: CPT | Performed by: STUDENT IN AN ORGANIZED HEALTH CARE EDUCATION/TRAINING PROGRAM

## 2025-03-17 PROCEDURE — 85025 COMPLETE CBC W/AUTO DIFF WBC: CPT | Performed by: STUDENT IN AN ORGANIZED HEALTH CARE EDUCATION/TRAINING PROGRAM

## 2025-03-17 PROCEDURE — 96374 THER/PROPH/DIAG INJ IV PUSH: CPT

## 2025-03-17 PROCEDURE — 2550000001 HC RX 255 CONTRASTS: Performed by: STUDENT IN AN ORGANIZED HEALTH CARE EDUCATION/TRAINING PROGRAM

## 2025-03-17 RX ORDER — PANTOPRAZOLE SODIUM 40 MG/10ML
40 INJECTION, POWDER, LYOPHILIZED, FOR SOLUTION INTRAVENOUS
Status: DISCONTINUED | OUTPATIENT
Start: 2025-03-18 | End: 2025-03-17 | Stop reason: HOSPADM

## 2025-03-17 RX ORDER — ONDANSETRON 4 MG/1
4 TABLET, ORALLY DISINTEGRATING ORAL EVERY 8 HOURS PRN
Status: DISCONTINUED | OUTPATIENT
Start: 2025-03-17 | End: 2025-03-17 | Stop reason: HOSPADM

## 2025-03-17 RX ORDER — GABAPENTIN 300 MG/1
900 CAPSULE ORAL NIGHTLY
Status: DISCONTINUED | OUTPATIENT
Start: 2025-03-17 | End: 2025-03-17 | Stop reason: HOSPADM

## 2025-03-17 RX ORDER — PANTOPRAZOLE SODIUM 40 MG/1
40 TABLET, DELAYED RELEASE ORAL
Status: DISCONTINUED | OUTPATIENT
Start: 2025-03-18 | End: 2025-03-17 | Stop reason: HOSPADM

## 2025-03-17 RX ORDER — MORPHINE SULFATE 4 MG/ML
4 INJECTION INTRAVENOUS ONCE
Status: COMPLETED | OUTPATIENT
Start: 2025-03-17 | End: 2025-03-17

## 2025-03-17 RX ORDER — GUAIFENESIN 600 MG/1
600 TABLET, EXTENDED RELEASE ORAL EVERY 12 HOURS PRN
Status: DISCONTINUED | OUTPATIENT
Start: 2025-03-17 | End: 2025-03-17 | Stop reason: HOSPADM

## 2025-03-17 RX ORDER — ASPIRIN 81 MG/1
81 TABLET ORAL DAILY
Status: DISCONTINUED | OUTPATIENT
Start: 2025-03-17 | End: 2025-03-17 | Stop reason: HOSPADM

## 2025-03-17 RX ORDER — TALC
3 POWDER (GRAM) TOPICAL NIGHTLY PRN
Status: DISCONTINUED | OUTPATIENT
Start: 2025-03-17 | End: 2025-03-17 | Stop reason: HOSPADM

## 2025-03-17 RX ORDER — POLYETHYLENE GLYCOL 3350 17 G/17G
17 POWDER, FOR SOLUTION ORAL DAILY
Status: DISCONTINUED | OUTPATIENT
Start: 2025-03-17 | End: 2025-03-17 | Stop reason: HOSPADM

## 2025-03-17 RX ORDER — MORPHINE SULFATE 2 MG/ML
2 INJECTION, SOLUTION INTRAMUSCULAR; INTRAVENOUS EVERY 4 HOURS PRN
Status: DISCONTINUED | OUTPATIENT
Start: 2025-03-17 | End: 2025-03-17 | Stop reason: HOSPADM

## 2025-03-17 RX ORDER — BISACODYL 10 MG/1
10 SUPPOSITORY RECTAL DAILY PRN
Status: DISCONTINUED | OUTPATIENT
Start: 2025-03-17 | End: 2025-03-17 | Stop reason: HOSPADM

## 2025-03-17 RX ORDER — ONDANSETRON HYDROCHLORIDE 2 MG/ML
4 INJECTION, SOLUTION INTRAVENOUS EVERY 8 HOURS PRN
Status: DISCONTINUED | OUTPATIENT
Start: 2025-03-17 | End: 2025-03-17 | Stop reason: HOSPADM

## 2025-03-17 RX ORDER — ENOXAPARIN SODIUM 100 MG/ML
40 INJECTION SUBCUTANEOUS EVERY 24 HOURS
Status: DISCONTINUED | OUTPATIENT
Start: 2025-03-17 | End: 2025-03-17 | Stop reason: HOSPADM

## 2025-03-17 RX ORDER — BISACODYL 5 MG
10 TABLET, DELAYED RELEASE (ENTERIC COATED) ORAL DAILY PRN
Status: DISCONTINUED | OUTPATIENT
Start: 2025-03-17 | End: 2025-03-17 | Stop reason: HOSPADM

## 2025-03-17 RX ADMIN — IOHEXOL 100 ML: 350 INJECTION, SOLUTION INTRAVENOUS at 14:00

## 2025-03-17 RX ADMIN — MORPHINE SULFATE 4 MG: 4 INJECTION INTRAVENOUS at 13:29

## 2025-03-17 SDOH — SOCIAL STABILITY: SOCIAL INSECURITY: HAVE YOU HAD THOUGHTS OF HARMING ANYONE ELSE?: NO

## 2025-03-17 SDOH — SOCIAL STABILITY: SOCIAL INSECURITY: WERE YOU ABLE TO COMPLETE ALL THE BEHAVIORAL HEALTH SCREENINGS?: YES

## 2025-03-17 ASSESSMENT — PATIENT HEALTH QUESTIONNAIRE - PHQ9
1. LITTLE INTEREST OR PLEASURE IN DOING THINGS: NOT AT ALL
SUM OF ALL RESPONSES TO PHQ9 QUESTIONS 1 & 2: 0
2. FEELING DOWN, DEPRESSED OR HOPELESS: NOT AT ALL

## 2025-03-17 ASSESSMENT — ACTIVITIES OF DAILY LIVING (ADL)
HEARING - RIGHT EAR: FUNCTIONAL
JUDGMENT_ADEQUATE_SAFELY_COMPLETE_DAILY_ACTIVITIES: YES
PATIENT'S MEMORY ADEQUATE TO SAFELY COMPLETE DAILY ACTIVITIES?: YES
FEEDING YOURSELF: INDEPENDENT
DRESSING YOURSELF: INDEPENDENT
WALKS IN HOME: INDEPENDENT
BATHING: INDEPENDENT
GROOMING: INDEPENDENT
HEARING - LEFT EAR: FUNCTIONAL
ADEQUATE_TO_COMPLETE_ADL: YES
LACK_OF_TRANSPORTATION: NO
TOILETING: INDEPENDENT

## 2025-03-17 ASSESSMENT — COGNITIVE AND FUNCTIONAL STATUS - GENERAL
DAILY ACTIVITIY SCORE: 24
MOBILITY SCORE: 24
PATIENT BASELINE BEDBOUND: NO

## 2025-03-17 ASSESSMENT — LIFESTYLE VARIABLES
SKIP TO QUESTIONS 9-10: 1
EVER FELT BAD OR GUILTY ABOUT YOUR DRINKING: NO
HAVE YOU EVER FELT YOU SHOULD CUT DOWN ON YOUR DRINKING: NO
HOW MANY STANDARD DRINKS CONTAINING ALCOHOL DO YOU HAVE ON A TYPICAL DAY: PATIENT DOES NOT DRINK
HAVE PEOPLE ANNOYED YOU BY CRITICIZING YOUR DRINKING: NO
HOW OFTEN DO YOU HAVE A DRINK CONTAINING ALCOHOL: NEVER
HOW OFTEN DO YOU HAVE 6 OR MORE DRINKS ON ONE OCCASION: NEVER
AUDIT-C TOTAL SCORE: 0
AUDIT-C TOTAL SCORE: 0
PRESCIPTION_ABUSE_PAST_12_MONTHS: NO
SUBSTANCE_ABUSE_PAST_12_MONTHS: NO

## 2025-03-17 ASSESSMENT — PAIN SCALES - GENERAL
PAINLEVEL_OUTOF10: 5 - MODERATE PAIN

## 2025-03-17 ASSESSMENT — PAIN - FUNCTIONAL ASSESSMENT: PAIN_FUNCTIONAL_ASSESSMENT: 0-10

## 2025-03-17 ASSESSMENT — PAIN DESCRIPTION - DESCRIPTORS: DESCRIPTORS: BURNING

## 2025-03-17 NOTE — DISCHARGE INSTRUCTIONS
You were seen in the emergency department for chest pain.  It was recommended that you be admitted for further testing.  We have referred you for a stress test and cardiology referral.  Order was also placed for a primary care referral.  If you have any new chest pain, shortness of breath, lightheadedness, or you have any new symptoms, please return to the emergency department for reevaluation.

## 2025-03-17 NOTE — ED PROVIDER NOTES
Chief Complaint   Patient presents with    Chest Pain    lump in left armpit        HPI:  65 y.o. male with a history of spinal stenosis on gabapentin who is presenting to the ED with chest pain.  Patient reports he was in his normal state of health until yesterday afternoon.  He was taking his dog outside when he developed left-sided chest pain radiating into the axilla and back.  He reports general malaise with the symptoms.  It continued throughout the night. He was awoken around 11 PM again with this pain.  This time he was also having associated diaphoresis.  When he still did not feel right today, he decided to come to the ED for evaluation.  Patient does report concern for pain and a lump in the left axilla but this is separate from the chest pain he is describing.  He denies any associated shortness of breath, nausea, vomiting.  He does have tingling and a cold sensation in the left upper extremity as well as tingling in the left lower extremity.  Lower extremity symptoms are chronic and he attributes this to his spinal stenosis but the left upper extremity symptoms are new.  He is a current smoker.  No personal history of cardiac disease, hypertension, hyperlipidemia, or diabetes.  No known family history of cardiac disease.    History provided by: patient  Limitations to history: none    ------------------------------------------------------------------------------------------------------------------------------------------    Physical Exam:  T 36.9 °C (98.4 °F)  HR 76  /89  RR 20  O2 99 %      Triage vitals reviewed.  Constitutional: Well developed adult in no acute distress, non toxic in appearance  Head: Normocephalic, atraumatic  Eyes: Pupils are equal. No conjunctival injection.  Neck: Supple. Trachea is midline.  Pulmonary: Normal work of breathing with no accessory muscle use noted.  Clear to auscultation bilaterally. No wheezing, rhonchi, rales. Left lateral rib and axillary tenderness to  palpation. No palpable masses.  Cardiovascular: RRR. 2+ radial pulses bilaterally.   Abdomen: Soft, nondistended. Nontender to palpation.  Extremities: No peripheral edema. No gross deformities.  Moving all extremities spontaneously without difficulty. Extremities warm, well perfused.  Neuro: Awake and alert. Face is symmetric.  Speech is clear. Sensation to light touch intact throughout.   Psych: Appropriate mood and affect.    ------------------------------------------------------------------------------------------------------------------------------------------    EKG 1 Interpreted by me.  Sinus rhythm.  Rate of 68 bpm.  RBBB and LAFB are seen on prior EKG dated 11/12/2023.  No other acute ST elevations or depressions.    EKG 2 interpreted by me.  Remains in sinus rhythm with rate of 57 bpm.  Still has RBBB and LAFB which are unchanged and no other acute dynamic changes compared to initial.    Medical Decision Making:  This patient is a 65 y.o. male with a history of spinal stenosis on gabapentin who is presenting to the ED with chest pain.  Exam as noted above.  ACS and aortic dissection both on differential.  Rib fracture also considered given his pain though no clear trauma.  Costochondritis also possibility.  Labs, EKG, and CT angio ordered.     ED workup overall unrevealing.  CTA negative for dissection.  Serial EKGs nonischemic.  Troponins x 2 negative.     See ED course below for remainder of details.    ED Course:  ED Course as of 03/18/25 1236   Mon Mar 17, 2025   1530 HEART score 4 []   1540 Discussed with hospitalist ERIKA who agrees with admission.  Will be admitted to telemetry floor.  Patient updated and agreeable with plan. []   3470 Called to bedside for discussion of disposition with patient.  Had previously discussed admission but patient realized that he does not have anybody to take care of his dog.  He would prefer discharge home.  Will be referred for outpatient stress testing.  Did  discuss return precautions including chest pain shortness of breath lightheadedness or any other new symptoms.  Will also be referred for PCP and cardiology follow-up. [DR]      ED Course User Index  [DR] Kirstin Cho DO         Diagnoses as of 03/18/25 1236   Acute chest pain        Clinical Impression:  Acute chest pain     Disposition:  Discharge to home    Kirstin Cho DO  Emergency Medicine         Kirstin Cho DO  03/18/25 1241

## 2025-03-19 ENCOUNTER — TREATMENT (OUTPATIENT)
Dept: PHYSICAL THERAPY | Facility: HOSPITAL | Age: 65
End: 2025-03-19
Payer: MEDICARE

## 2025-03-19 DIAGNOSIS — M48.062 SPINAL STENOSIS OF LUMBAR REGION WITH NEUROGENIC CLAUDICATION: ICD-10-CM

## 2025-03-19 DIAGNOSIS — M54.16 LUMBAR RADICULOPATHY: ICD-10-CM

## 2025-03-19 DIAGNOSIS — M47.816 LUMBAR SPONDYLOSIS: Primary | ICD-10-CM

## 2025-03-19 LAB
ATRIAL RATE: 57 BPM
ATRIAL RATE: 58 BPM
ATRIAL RATE: 68 BPM
P AXIS: 38 DEGREES
P AXIS: 58 DEGREES
P AXIS: 66 DEGREES
PR INTERVAL: 151 MS
PR INTERVAL: 154 MS
PR INTERVAL: 156 MS
Q ONSET: 251 MS
Q ONSET: 251 MS
Q ONSET: 253 MS
QRS COUNT: 11 BEATS
QRS COUNT: 9 BEATS
QRS COUNT: 9 BEATS
QRS DURATION: 155 MS
QRS DURATION: 155 MS
QRS DURATION: 164 MS
QT INTERVAL: 404 MS
QT INTERVAL: 442 MS
QT INTERVAL: 450 MS
QTC CALCULATION(BAZETT): 430 MS
QTC CALCULATION(BAZETT): 431 MS
QTC CALCULATION(BAZETT): 446 MS
QTC FREDERICIA: 421 MS
QTC FREDERICIA: 434 MS
QTC FREDERICIA: 447 MS
R AXIS: -65 DEGREES
R AXIS: -67 DEGREES
R AXIS: -77 DEGREES
T AXIS: 25 DEGREES
T AXIS: 26 DEGREES
T AXIS: 29 DEGREES
T OFFSET: 453 MS
T OFFSET: 474 MS
T OFFSET: 476 MS
VENTRICULAR RATE: 57 BPM
VENTRICULAR RATE: 59 BPM
VENTRICULAR RATE: 68 BPM

## 2025-03-19 PROCEDURE — 97113 AQUATIC THERAPY/EXERCISES: CPT | Mod: GP,CQ

## 2025-03-19 ASSESSMENT — PAIN - FUNCTIONAL ASSESSMENT: PAIN_FUNCTIONAL_ASSESSMENT: 0-10

## 2025-03-19 ASSESSMENT — PAIN SCALES - GENERAL: PAINLEVEL_OUTOF10: 5 - MODERATE PAIN

## 2025-03-19 ASSESSMENT — PAIN DESCRIPTION - DESCRIPTORS: DESCRIPTORS: TIGHTNESS;ACHING

## 2025-03-19 NOTE — PROGRESS NOTES
"Physical Therapy    Physical Therapy Treatment    Patient Name: Mateo Smith  MRN: [id number]  Today's Date: 3/19/2025  Time Calculation  Start Time: 0845  Stop Time: 0930  Time Calculation (min): 45 min  VISIT 5  PT Therapeutic Procedures Time Entry  Aquatic Therapy Time Entry: 45    Assessment:  PT Assessment  Assessment Comment: pt reports decreased tightness in LB after treatment VC for posture with  amb in water, pain 3/10 after distraction in L LB    Plan:  OP PT Plan  Treatment/Interventions: Aquatic therapy, Education/ Instruction, Therapeutic exercises, Neuromuscular re-education, Therapeutic activities, Manual therapy  PT Plan: Skilled PT (progress as rizwan for decreased pain)  Duration: 6 week  Onset Date: 11/24/24    Current Problem  1. Lumbar spondylosis        2. Lumbar radiculopathy  Follow Up In Physical Therapy      3. Spinal stenosis of lumbar region with neurogenic claudication            Subjective  pt reports walking dog around block yesterday pain 5/10 in L LB today       Precautions  Precautions  Precautions Comment: lumbar spine spondylolisthesis     Pain  Pain Assessment: 0-10  0-10 (Numeric) Pain Score: 5 - Moderate pain  Pain Location: Back  Pain Orientation: Left, Lower  Pain Descriptors: Tightness, Aching  Pain Frequency: Constant/continuous    Objective initiated aquatics        Treatments:     Aquatic Exercise  Aquatic Exercise Performed: Yes  Aquatic Exercise Activity 1: Amb Forw/Retro/Lat x 2 EA  Aquatic Exercise Activity 2: Marching x 1  Aquatic Exercise Activity 3: Squats x15  Aquatic Exercise Activity 4: TR/HR x 15 EA  Aquatic Exercise Activity 5: SLR x 10 EA F/L  Aquatic Exercise Activity 6: gastroc and HS stretches  3x 15\" EA  Aquatic Exercise Activity 7: Deep water distraction x 5' flex      Goals:  Active       PT Problem       PT Goal       Start:  02/11/25    Expected End:  03/04/25       1 Patient to be independent with home exercises within pain free range to stretch Lower " extremities and to strengthen core abdominals and core stabilization to increase mobility  2 Patient to demonstrate understanding of what it means to have neutral posture alignment, the use of positioning strategies and body mechanics principles to reduce pain with everyday activities.  3 Patient to gain the functional range of motion necessary in the  lumbar spine to perform activities of daily living with increased ease.  4 Pt to modify his posture with standing doing puzzles , to use good alignment strategies to reduce lumbar radiculopathy         PT Goal        Start:  02/11/25    Expected End:  03/25/25       1 Patient to have reduced pain by 50% or more for increased function and mobility  2 Patient to demonstrate 1/3 MMT strength gain  in  targeted muscle groups and core strength for increased overall mobility  3 Patient to demonstrate a 4 point or more change in Oswestry to indicate reduced impairment with every day living   4 Patient to return to community mobility and household chores and job related activity with pain levels managed using increased awareness of engaging core stabilization and strategies utilized to manage pain.

## 2025-03-20 ENCOUNTER — APPOINTMENT (OUTPATIENT)
Dept: CARDIOLOGY | Facility: HOSPITAL | Age: 65
End: 2025-03-20
Payer: MEDICARE

## 2025-03-27 ENCOUNTER — TREATMENT (OUTPATIENT)
Dept: PHYSICAL THERAPY | Facility: HOSPITAL | Age: 65
End: 2025-03-27
Payer: MEDICARE

## 2025-03-27 DIAGNOSIS — M47.816 LUMBAR SPONDYLOSIS: Primary | ICD-10-CM

## 2025-03-27 DIAGNOSIS — M54.16 LUMBAR RADICULOPATHY: ICD-10-CM

## 2025-03-27 DIAGNOSIS — M48.062 SPINAL STENOSIS OF LUMBAR REGION WITH NEUROGENIC CLAUDICATION: ICD-10-CM

## 2025-03-27 PROCEDURE — 97113 AQUATIC THERAPY/EXERCISES: CPT | Mod: GP,CQ

## 2025-03-27 ASSESSMENT — PAIN SCALES - GENERAL: PAINLEVEL_OUTOF10: 5 - MODERATE PAIN

## 2025-03-27 ASSESSMENT — PAIN - FUNCTIONAL ASSESSMENT: PAIN_FUNCTIONAL_ASSESSMENT: 0-10

## 2025-03-27 ASSESSMENT — PAIN DESCRIPTION - DESCRIPTORS: DESCRIPTORS: TIGHTNESS;ACHING

## 2025-03-27 NOTE — PROGRESS NOTES
"Physical Therapy    Physical Therapy Treatment    Patient Name: Mateo Smith  MRN: [id number]  Today's Date: 3/27/2025  Time Calculation  Start Time: 0930  Stop Time: 1015  Time Calculation (min): 45 min  VISIT 6  PT Therapeutic Procedures Time Entry  Aquatic Therapy Time Entry: 45    Assessment:  PT Assessment  Assessment Comment: pt reports decreased tightness in LB after treatment, pain 2/10 after distraction, good rizwan of new exs and increased reps    Plan:  OP PT Plan  Treatment/Interventions: Aquatic therapy, Education/ Instruction, Therapeutic exercises, Neuromuscular re-education, Therapeutic activities, Manual therapy  PT Plan: Skilled PT (pt to schedule reassess for next visit)  Duration: 6 week  Onset Date: 11/24/24    Current Problem  1. Lumbar spondylosis        2. Lumbar radiculopathy  Follow Up In Physical Therapy      3. Spinal stenosis of lumbar region with neurogenic claudication            Subjective  pt reports felt ok after 1st visit        Precautions  Precautions  STEADI Fall Risk Score (The score of 4 or more indicates an increased risk of falling): 4  Precautions Comment: lumbar spine spondylolisthesis     Pain  Pain Assessment: 0-10  0-10 (Numeric) Pain Score: 5 - Moderate pain  Pain Location: Back  Pain Orientation: Left, Lower  Pain Descriptors: Tightness, Aching  Pain Frequency: Constant/continuous    Objective increased reps added step ups and tray exs        Treatments:     Aquatic Exercise  Aquatic Exercise Performed: Yes  Aquatic Exercise Activity 1: Amb Forw/Retro/Lat x 2 EA  Aquatic Exercise Activity 2: Marching x 1  Aquatic Exercise Activity 3: Squats x15  Aquatic Exercise Activity 4: TR/HR x 15 EA  Aquatic Exercise Activity 5: SLR x 10 EA F/L  Aquatic Exercise Activity 6: gastroc and HS stretches  3x 15\" EA  Aquatic Exercise Activity 7: Deep water distraction x 5' flex  Aquatic Exercise Activity 8: Step ups x10 EA  Aquatic Exercise Activity 9: TRay exs x 15 EA T1 Push/Pull, Up/Down "      Goals:  Active       PT Problem       PT Goal       Start:  02/11/25    Expected End:  03/04/25       1 Patient to be independent with home exercises within pain free range to stretch Lower extremities and to strengthen core abdominals and core stabilization to increase mobility  2 Patient to demonstrate understanding of what it means to have neutral posture alignment, the use of positioning strategies and body mechanics principles to reduce pain with everyday activities.  3 Patient to gain the functional range of motion necessary in the  lumbar spine to perform activities of daily living with increased ease.  4 Pt to modify his posture with standing doing puzzles , to use good alignment strategies to reduce lumbar radiculopathy         PT Goal        Start:  02/11/25    Expected End:  03/25/25       1 Patient to have reduced pain by 50% or more for increased function and mobility  2 Patient to demonstrate 1/3 MMT strength gain  in  targeted muscle groups and core strength for increased overall mobility  3 Patient to demonstrate a 4 point or more change in Oswestry to indicate reduced impairment with every day living   4 Patient to return to community mobility and household chores and job related activity with pain levels managed using increased awareness of engaging core stabilization and strategies utilized to manage pain.

## 2025-04-02 ENCOUNTER — OFFICE VISIT (OUTPATIENT)
Dept: PAIN MEDICINE | Facility: HOSPITAL | Age: 65
End: 2025-04-02
Payer: MEDICARE

## 2025-04-02 VITALS
HEIGHT: 67 IN | HEART RATE: 75 BPM | WEIGHT: 150.1 LBS | DIASTOLIC BLOOD PRESSURE: 86 MMHG | OXYGEN SATURATION: 95 % | BODY MASS INDEX: 23.56 KG/M2 | RESPIRATION RATE: 16 BRPM | SYSTOLIC BLOOD PRESSURE: 148 MMHG

## 2025-04-02 DIAGNOSIS — M47.816 LUMBAR SPONDYLOSIS: ICD-10-CM

## 2025-04-02 DIAGNOSIS — M48.062 SPINAL STENOSIS OF LUMBAR REGION WITH NEUROGENIC CLAUDICATION: Primary | ICD-10-CM

## 2025-04-02 DIAGNOSIS — M54.16 LUMBAR RADICULOPATHY: ICD-10-CM

## 2025-04-02 PROCEDURE — G2211 COMPLEX E/M VISIT ADD ON: HCPCS | Performed by: CLINICAL NURSE SPECIALIST

## 2025-04-02 PROCEDURE — 99214 OFFICE O/P EST MOD 30 MIN: CPT | Performed by: CLINICAL NURSE SPECIALIST

## 2025-04-02 PROCEDURE — 1160F RVW MEDS BY RX/DR IN RCRD: CPT | Performed by: CLINICAL NURSE SPECIALIST

## 2025-04-02 PROCEDURE — 1159F MED LIST DOCD IN RCRD: CPT | Performed by: CLINICAL NURSE SPECIALIST

## 2025-04-02 PROCEDURE — 3008F BODY MASS INDEX DOCD: CPT | Performed by: CLINICAL NURSE SPECIALIST

## 2025-04-02 PROCEDURE — 1125F AMNT PAIN NOTED PAIN PRSNT: CPT | Performed by: CLINICAL NURSE SPECIALIST

## 2025-04-02 RX ORDER — GABAPENTIN 300 MG/1
CAPSULE ORAL
Qty: 120 CAPSULE | Refills: 2 | Status: SHIPPED | OUTPATIENT
Start: 2025-04-02

## 2025-04-02 RX ORDER — ACETAMINOPHEN 500 MG
TABLET ORAL EVERY 6 HOURS PRN
COMMUNITY

## 2025-04-02 RX ORDER — ACETAMINOPHEN AND IBUPROFEN 250; 125 MG/1; MG/1
TABLET, FILM COATED ORAL
COMMUNITY

## 2025-04-02 ASSESSMENT — PAIN SCALES - GENERAL: PAINLEVEL_OUTOF10: 8

## 2025-04-02 NOTE — PROGRESS NOTES
"Subjective   Patient ID: Mateo Smith is a 65 y.o. male who presents for Back Pain.  HPI    65-year-old male presents for follow-up of low back pain which increased since early November after lifting his dog up onto his bed.  He has seen a chiropractor for several months with no relief.  No prior history of surgery. Pt presents at today's office visit for follow up from (I) LESI L5/S1 2/21/25.  Pt sts, \"I had a little bit of pain relief when I left the day of the injection but none since.\"  Pt sts, \"I've been in PT since 2/11/25.\"  Pt sts he had \"severe pain\" following PT with leg set exercises and they then had him do aqua therapy.  Felt that aqua therapy increased his pain as well.  Pt reports a \"8/10\" pain score.  Currently experiencing low back pain radiating into bilateral hips and bilateral lower extremities left greater than right.  Pain accompanied by lower extremity numbness and tingling as well as weakness.  He has noted some difficulty with urinary hesitancy.  No issues with bowel function.  Occasionally his pain will radiate to his abdomen and up toward his chest.  Pain is aching and throbbing.  He is unsure what increases his pain.  He does notice more pain when he standing, walking and bending.  He was evaluated in the ED for chest pain on 3/17/2025.  Provided a referral for a stress test and cardiology evaluation.  He did not go for the stress test.  He is planning to follow-up with cardiology on 4//25.  Pt denies chest pain at today's visit. Denies diaphoresis, shortness of breath or dizziness. Pt is currently taking gabapentin 900 mg @ hs. tolerating without adverse effects.  He is unsure how much relief this medication is providing.  He will supplement with Tylenol and Advil.  Utilizing ice and heat.  No relief with lidocaine patches.           OARRS:  Lisa Kumar, APRN-CNP, APRN-CNS on 4/2/2025 10:20 AM  I have personally reviewed the OARRS report for Mateo Smith. I have considered the risks " of abuse, dependence, addiction and diversion      Review of Systems    ROS:   General: No fevers, chills, weight loss  Skin: Negative for lesions  Eyes: No acute vision changes  Ears: No vertigo  Nose, mouth, throat: No difficulty swallowing or speaking  Respiratory: No cough, shortness of breath, cyanosis  Cardiovascular: Negative for chest pain syncope or palpitation  Gastrointestinal: No constipation, nausea, vomiting  Neurological: Negative for headache, positive for: Paresthesia and weakness  Psychological: Negative for severe or debilitating anxiety, depression. Negative memory loss  Musculoskeletal: Positive for arthralgia, myalgia and pain  Endocrine: Negative for weight gain, appetite changes, excessive sweating  Allergy/immune: Negative    All 13 systems were reviewed and are within normal levels except as noted or in the history of present illness.  Positive or pertinent negative responses are noted or were in the history of present illness. As noted, the patient denies significant or impairing weakness in the bilateral upper and lower extremities, medication induced constipation, and bowel or bladder incontinence.     Current Outpatient Medications:     acetaminophen (Tylenol) 500 mg tablet, Take by mouth every 6 hours if needed for mild pain (1 - 3)., Disp: , Rfl:     ibuprofen-acetaminophen 125-250 mg tablet per tablet, Take by mouth., Disp: , Rfl:     gabapentin (Neurontin) 300 mg capsule, Patient will take gabapentin 300 mg in the morning and 900 mg at bedtime., Disp: 120 capsule, Rfl: 2     Past Medical History:   Diagnosis Date    Personal history of other diseases of the digestive system     History of gastroesophageal reflux (GERD)    Skull fracture (Multi)         Past Surgical History:   Procedure Laterality Date    OTHER SURGICAL HISTORY  06/27/2019    Cystoscopy    OTHER SURGICAL HISTORY  06/27/2019    Urethral dilation        No family history on file.     No Known Allergies     Objective  "    Visit Vitals  /86 (BP Location: Right arm)   Pulse 75   Resp 16   Ht 1.702 m (5' 7\")   Wt 68.1 kg (150 lb 1.6 oz)   SpO2 95%   BMI 23.51 kg/m²   Smoking Status Every Day   BSA 1.79 m²        Physical Exam    PE:  General: Well-developed, well-nourished, no acute distress. The patient demonstrates no pain behavior, symptom magnification or overt drug-seeking behavior.  Eye: Pupils appropriate for room lighting  Neck/thyroid: No obvious goiter or enlargement of neck noted  Respiratory exam: Normal respiratory effort, unlabored respiration. No accessory muscle use noted  Cardiac exam: Bilateral radial pulses intact  Abdominal: Nondistended  Spine, lumbar: The patient is able to rise from a seated to standing position without hesitancy, push off, or delay. Gait is grossly nonantalgic. Tenderness to paraspinous musculature is noted lower lumbar region bilaterally.  Flexion and extension both limited with extension increasing pain to low back and into lower extremities.  Positive straight leg raise left lower extremity.  Neurologic exam: Muscle strength is antigravity in all 4 extremities.  Equal muscle strength bilateral lower extremities 5/5.  Patient is able to dorsiflex and plantarflex without difficulty.  Psychiatric exam: Judgment and insight normal, affect normal, speech is fluent, affect appropriate, demonstrating no signs of hypersomnolence, sedation, or confusion        Assessment/Plan   Problem List Items Addressed This Visit             ICD-10-CM    Spinal stenosis of lumbar region with neurogenic claudication - Primary M48.062     65-year-old male presents for follow-up of lower back pain radiating into bilateral lower extremities with claudication symptoms.   CT scan of his lumbar spine showing degenerative changes with some grade 1 anterolisthesis of L4 and L5 and some ligamentum flavum hypertrophy causing some central canal stenosis at that level as well as foraminal stenosis.  There was some " question of the CT scan about a laminectomy on the right however patient has not had any type of back surgery.  Previous chiropractic treatment did not provide relief.  Patient was scheduled for an interlaminar lumbar epidural steroid injection at L5-S1.  Presenting at today's office visit for follow-up after his injection.  Unfortunately patient received minimal relief with this injection.  He states he may have had relief for 1 to 2 days.  Continues to experience lumbar radicular pain radiating into bilateral lower extremities to the level of his feet.  Pain accompanied by numbness/tingling and subjective weakness.  He is noticing some urinary hesitancy.  Denies any bowel dysfunction.  He denies any saddle anesthesia.  He has done a formal course of physical therapy including land and water therapy which he states increased his pain.  He has failed oral NSAIDs.  He will supplement with Tylenol.  He utilizes ice alternating with heat.  Due to failure of conservative treatment including medications, injections and physical therapy, it is reasonable to send the patient for updated imaging in the form of a lumbar MRI.  Advised patient to follow-up in our office after completing his MRI for reevaluation.  Recommended he may continue Tylenol keeping total Tylenol dose less than 3000 mg in 24 hours.  He may supplement with occasional oral NSAIDs.  He currently is tolerating gabapentin 900 mg at bedtime.  He is unsure how much relief this medication is providing.  Explained to patient that gabapentin is a subtle medication and directly affects nerve pain.  He may not notice the pain relief unless he stops the medication. Encouraged him not to interrupt this medication.  We will increase gabapentin dose to 300 mg in the morning and 900 mg at bedtime for additional neuropathic/radicular pain relief.  Reviewed with patient at today's visit.    - Considering patient has worsening symptoms of lumbar radiculopathy and failed  conservative treatment including physical therapy, injections and medications; we will send for MRI lumbar spine.  Further treatment recommendations based on results of imaging.    -Increase gabapentin to 300 mg in the morning and 900 mg at bedtime. The patient was counseled on the risks and potential side effects of gabapentin as well as its importance for dosage titration. Side effects included but were not limited to, drowsiness, sedation, cognitive decline, peripheral edema, weight gain, seizures, with abrupt withdrawal.  Patient was instructed to call the office with any concerns or side effects before abruptly stopping medication.   -Encouraged patient to continue home therapy exercises and stretches that he is able to tolerate.  -Advised patient to go to the ED if he experiences an increase in neurologic symptoms associated with his back pain.  -Recommended patient follow-up with cardiology to discuss chest pain and if he experiences chest pain at any time to be evaluated in the emergency department.  -Patient will follow-up approximately 3 to 4 weeks after his MRI for reevaluation.         Relevant Medications    gabapentin (Neurontin) 300 mg capsule    Other Relevant Orders    MR lumbar spine wo IV contrast    Lumbar spondylosis M47.816    Relevant Medications    gabapentin (Neurontin) 300 mg capsule    Lumbar radiculopathy M54.16    Relevant Orders    MR lumbar spine wo IV contrast

## 2025-04-02 NOTE — ASSESSMENT & PLAN NOTE
65-year-old male presents for follow-up of lower back pain radiating into bilateral lower extremities with claudication symptoms.   CT scan of his lumbar spine showing degenerative changes with some grade 1 anterolisthesis of L4 and L5 and some ligamentum flavum hypertrophy causing some central canal stenosis at that level as well as foraminal stenosis.  There was some question of the CT scan about a laminectomy on the right however patient has not had any type of back surgery.  Previous chiropractic treatment did not provide relief.  Patient was scheduled for an interlaminar lumbar epidural steroid injection at L5-S1.  Presenting at today's office visit for follow-up after his injection.  Unfortunately patient received minimal relief with this injection.  He states he may have had relief for 1 to 2 days.  Continues to experience lumbar radicular pain radiating into bilateral lower extremities to the level of his feet.  Pain accompanied by numbness/tingling and subjective weakness.  He is noticing some urinary hesitancy.  Denies any bowel dysfunction.  He denies any saddle anesthesia.  He has done a formal course of physical therapy including land and water therapy which he states increased his pain.  He has failed oral NSAIDs.  He will supplement with Tylenol.  He utilizes ice alternating with heat.  Due to failure of conservative treatment including medications, injections and physical therapy, it is reasonable to send the patient for updated imaging in the form of a lumbar MRI.  Advised patient to follow-up in our office after completing his MRI for reevaluation.  Recommended he may continue Tylenol keeping total Tylenol dose less than 3000 mg in 24 hours.  He may supplement with occasional oral NSAIDs.  He currently is tolerating gabapentin 900 mg at bedtime.  He is unsure how much relief this medication is providing.  Explained to patient that gabapentin is a subtle medication and directly affects nerve pain.   He may not notice the pain relief unless he stops the medication. Encouraged him not to interrupt this medication.  We will increase gabapentin dose to 300 mg in the morning and 900 mg at bedtime for additional neuropathic/radicular pain relief.  Reviewed with patient at today's visit.    - Considering patient has worsening symptoms of lumbar radiculopathy and failed conservative treatment including physical therapy, injections and medications; we will send for MRI lumbar spine.  Further treatment recommendations based on results of imaging.    -Increase gabapentin to 300 mg in the morning and 900 mg at bedtime. The patient was counseled on the risks and potential side effects of gabapentin as well as its importance for dosage titration. Side effects included but were not limited to, drowsiness, sedation, cognitive decline, peripheral edema, weight gain, seizures, with abrupt withdrawal.  Patient was instructed to call the office with any concerns or side effects before abruptly stopping medication.   -Encouraged patient to continue home therapy exercises and stretches that he is able to tolerate.  -Advised patient to go to the ED if he experiences an increase in neurologic symptoms associated with his back pain.  -Recommended patient follow-up with cardiology to discuss chest pain and if he experiences chest pain at any time to be evaluated in the emergency department.  -Patient will follow-up approximately 3 to 4 weeks after his MRI for reevaluation.

## 2025-04-09 ENCOUNTER — HOSPITAL ENCOUNTER (OUTPATIENT)
Facility: HOSPITAL | Age: 65
Setting detail: OBSERVATION
Discharge: HOME | End: 2025-04-10
Attending: EMERGENCY MEDICINE | Admitting: INTERNAL MEDICINE
Payer: MEDICARE

## 2025-04-09 ENCOUNTER — APPOINTMENT (OUTPATIENT)
Dept: CARDIOLOGY | Facility: HOSPITAL | Age: 65
End: 2025-04-09
Payer: MEDICARE

## 2025-04-09 ENCOUNTER — APPOINTMENT (OUTPATIENT)
Dept: RADIOLOGY | Facility: HOSPITAL | Age: 65
End: 2025-04-09
Payer: MEDICARE

## 2025-04-09 DIAGNOSIS — M47.816 LUMBAR SPONDYLOSIS: ICD-10-CM

## 2025-04-09 DIAGNOSIS — R07.9 CHEST PAIN, UNSPECIFIED TYPE: Primary | ICD-10-CM

## 2025-04-09 DIAGNOSIS — M48.062 SPINAL STENOSIS OF LUMBAR REGION WITH NEUROGENIC CLAUDICATION: ICD-10-CM

## 2025-04-09 PROBLEM — N40.0 BPH (BENIGN PROSTATIC HYPERPLASIA): Status: ACTIVE | Noted: 2025-04-09

## 2025-04-09 LAB
ALBUMIN SERPL BCP-MCNC: 4.9 G/DL (ref 3.4–5)
ALP SERPL-CCNC: 62 U/L (ref 33–136)
ALT SERPL W P-5'-P-CCNC: 15 U/L (ref 10–52)
ANION GAP SERPL CALC-SCNC: 12 MMOL/L (ref 10–20)
AST SERPL W P-5'-P-CCNC: 19 U/L (ref 9–39)
BASOPHILS # BLD AUTO: 0.01 X10*3/UL (ref 0–0.1)
BASOPHILS NFR BLD AUTO: 0.2 %
BILIRUB SERPL-MCNC: 1.1 MG/DL (ref 0–1.2)
BNP SERPL-MCNC: 16 PG/ML (ref 0–99)
BUN SERPL-MCNC: 13 MG/DL (ref 6–23)
CALCIUM SERPL-MCNC: 10.2 MG/DL (ref 8.6–10.3)
CARDIAC TROPONIN I PNL SERPL HS: 6 NG/L (ref 0–20)
CHLORIDE SERPL-SCNC: 105 MMOL/L (ref 98–107)
CO2 SERPL-SCNC: 27 MMOL/L (ref 21–32)
CREAT SERPL-MCNC: 0.8 MG/DL (ref 0.5–1.3)
EGFRCR SERPLBLD CKD-EPI 2021: >90 ML/MIN/1.73M*2
EOSINOPHIL # BLD AUTO: 0 X10*3/UL (ref 0–0.7)
EOSINOPHIL NFR BLD AUTO: 0 %
ERYTHROCYTE [DISTWIDTH] IN BLOOD BY AUTOMATED COUNT: 13.1 % (ref 11.5–14.5)
GLUCOSE SERPL-MCNC: 97 MG/DL (ref 74–99)
HCT VFR BLD AUTO: 44 % (ref 41–52)
HGB BLD-MCNC: 14.5 G/DL (ref 13.5–17.5)
IMM GRANULOCYTES # BLD AUTO: 0.01 X10*3/UL (ref 0–0.7)
IMM GRANULOCYTES NFR BLD AUTO: 0.2 % (ref 0–0.9)
LYMPHOCYTES # BLD AUTO: 2.23 X10*3/UL (ref 1.2–4.8)
LYMPHOCYTES NFR BLD AUTO: 34.6 %
MAGNESIUM SERPL-MCNC: 1.89 MG/DL (ref 1.6–2.4)
MCH RBC QN AUTO: 29.2 PG (ref 26–34)
MCHC RBC AUTO-ENTMCNC: 33 G/DL (ref 32–36)
MCV RBC AUTO: 89 FL (ref 80–100)
MONOCYTES # BLD AUTO: 0.5 X10*3/UL (ref 0.1–1)
MONOCYTES NFR BLD AUTO: 7.8 %
NEUTROPHILS # BLD AUTO: 3.7 X10*3/UL (ref 1.2–7.7)
NEUTROPHILS NFR BLD AUTO: 57.2 %
NRBC BLD-RTO: 0 /100 WBCS (ref 0–0)
PLATELET # BLD AUTO: 207 X10*3/UL (ref 150–450)
POTASSIUM SERPL-SCNC: 4.3 MMOL/L (ref 3.5–5.3)
PROT SERPL-MCNC: 7.6 G/DL (ref 6.4–8.2)
RBC # BLD AUTO: 4.96 X10*6/UL (ref 4.5–5.9)
SODIUM SERPL-SCNC: 140 MMOL/L (ref 136–145)
WBC # BLD AUTO: 6.5 X10*3/UL (ref 4.4–11.3)

## 2025-04-09 PROCEDURE — 93005 ELECTROCARDIOGRAM TRACING: CPT

## 2025-04-09 PROCEDURE — 99285 EMERGENCY DEPT VISIT HI MDM: CPT | Performed by: EMERGENCY MEDICINE

## 2025-04-09 PROCEDURE — 71045 X-RAY EXAM CHEST 1 VIEW: CPT | Mod: FOREIGN READ | Performed by: RADIOLOGY

## 2025-04-09 PROCEDURE — 84075 ASSAY ALKALINE PHOSPHATASE: CPT | Performed by: EMERGENCY MEDICINE

## 2025-04-09 PROCEDURE — G0378 HOSPITAL OBSERVATION PER HR: HCPCS

## 2025-04-09 PROCEDURE — 36415 COLL VENOUS BLD VENIPUNCTURE: CPT | Performed by: EMERGENCY MEDICINE

## 2025-04-09 PROCEDURE — 2500000001 HC RX 250 WO HCPCS SELF ADMINISTERED DRUGS (ALT 637 FOR MEDICARE OP)

## 2025-04-09 PROCEDURE — 85025 COMPLETE CBC W/AUTO DIFF WBC: CPT | Performed by: EMERGENCY MEDICINE

## 2025-04-09 PROCEDURE — 83880 ASSAY OF NATRIURETIC PEPTIDE: CPT | Performed by: EMERGENCY MEDICINE

## 2025-04-09 PROCEDURE — 84484 ASSAY OF TROPONIN QUANT: CPT | Performed by: EMERGENCY MEDICINE

## 2025-04-09 PROCEDURE — 83735 ASSAY OF MAGNESIUM: CPT | Performed by: EMERGENCY MEDICINE

## 2025-04-09 PROCEDURE — 2500000001 HC RX 250 WO HCPCS SELF ADMINISTERED DRUGS (ALT 637 FOR MEDICARE OP): Performed by: EMERGENCY MEDICINE

## 2025-04-09 PROCEDURE — 71045 X-RAY EXAM CHEST 1 VIEW: CPT

## 2025-04-09 PROCEDURE — 99223 1ST HOSP IP/OBS HIGH 75: CPT

## 2025-04-09 PROCEDURE — 84484 ASSAY OF TROPONIN QUANT: CPT

## 2025-04-09 RX ORDER — ONDANSETRON HYDROCHLORIDE 2 MG/ML
4 INJECTION, SOLUTION INTRAVENOUS EVERY 6 HOURS PRN
Status: DISCONTINUED | OUTPATIENT
Start: 2025-04-09 | End: 2025-04-10 | Stop reason: HOSPADM

## 2025-04-09 RX ORDER — GUAIFENESIN 600 MG/1
600 TABLET, EXTENDED RELEASE ORAL EVERY 12 HOURS PRN
Status: DISCONTINUED | OUTPATIENT
Start: 2025-04-09 | End: 2025-04-10 | Stop reason: HOSPADM

## 2025-04-09 RX ORDER — PANTOPRAZOLE SODIUM 40 MG/10ML
40 INJECTION, POWDER, LYOPHILIZED, FOR SOLUTION INTRAVENOUS
Status: DISCONTINUED | OUTPATIENT
Start: 2025-04-10 | End: 2025-04-10 | Stop reason: HOSPADM

## 2025-04-09 RX ORDER — NITROGLYCERIN 0.4 MG/1
0.4 TABLET SUBLINGUAL EVERY 5 MIN PRN
Status: DISCONTINUED | OUTPATIENT
Start: 2025-04-09 | End: 2025-04-10 | Stop reason: HOSPADM

## 2025-04-09 RX ORDER — ATROPINE SULFATE 0.1 MG/ML
.25-2 INJECTION INTRAVENOUS
Status: DISCONTINUED | OUTPATIENT
Start: 2025-04-09 | End: 2025-04-09

## 2025-04-09 RX ORDER — ACETAMINOPHEN 325 MG/1
650 TABLET ORAL EVERY 4 HOURS PRN
Status: DISCONTINUED | OUTPATIENT
Start: 2025-04-09 | End: 2025-04-10 | Stop reason: HOSPADM

## 2025-04-09 RX ORDER — TALC
3 POWDER (GRAM) TOPICAL NIGHTLY PRN
Status: DISCONTINUED | OUTPATIENT
Start: 2025-04-09 | End: 2025-04-10 | Stop reason: HOSPADM

## 2025-04-09 RX ORDER — PANTOPRAZOLE SODIUM 40 MG/1
40 TABLET, DELAYED RELEASE ORAL
Status: DISCONTINUED | OUTPATIENT
Start: 2025-04-10 | End: 2025-04-10 | Stop reason: HOSPADM

## 2025-04-09 RX ORDER — OXYCODONE AND ACETAMINOPHEN 5; 325 MG/1; MG/1
1 TABLET ORAL ONCE
Status: COMPLETED | OUTPATIENT
Start: 2025-04-09 | End: 2025-04-09

## 2025-04-09 RX ORDER — NAPROXEN SODIUM 220 MG/1
324 TABLET, FILM COATED ORAL ONCE
Status: COMPLETED | OUTPATIENT
Start: 2025-04-09 | End: 2025-04-09

## 2025-04-09 RX ORDER — POLYETHYLENE GLYCOL 3350 17 G/17G
17 POWDER, FOR SOLUTION ORAL DAILY PRN
Status: DISCONTINUED | OUTPATIENT
Start: 2025-04-09 | End: 2025-04-10 | Stop reason: HOSPADM

## 2025-04-09 RX ORDER — MORPHINE SULFATE 2 MG/ML
1 INJECTION, SOLUTION INTRAMUSCULAR; INTRAVENOUS ONCE
Status: COMPLETED | OUTPATIENT
Start: 2025-04-10 | End: 2025-04-10

## 2025-04-09 RX ORDER — ENOXAPARIN SODIUM 100 MG/ML
40 INJECTION SUBCUTANEOUS EVERY 24 HOURS
Status: DISCONTINUED | OUTPATIENT
Start: 2025-04-09 | End: 2025-04-10 | Stop reason: HOSPADM

## 2025-04-09 RX ORDER — GABAPENTIN 300 MG/1
300 CAPSULE ORAL 2 TIMES DAILY
Status: DISCONTINUED | OUTPATIENT
Start: 2025-04-09 | End: 2025-04-10

## 2025-04-09 RX ORDER — DOBUTAMINE HYDROCHLORIDE 100 MG/100ML
5-40 INJECTION INTRAVENOUS CONTINUOUS
Status: DISCONTINUED | OUTPATIENT
Start: 2025-04-09 | End: 2025-04-09

## 2025-04-09 RX ADMIN — OXYCODONE HYDROCHLORIDE AND ACETAMINOPHEN 1 TABLET: 5; 325 TABLET ORAL at 13:11

## 2025-04-09 RX ADMIN — ASPIRIN 81 MG CHEWABLE TABLET 324 MG: 81 TABLET CHEWABLE at 13:11

## 2025-04-09 RX ADMIN — NITROGLYCERIN 0.4 MG: 0.4 TABLET SUBLINGUAL at 22:55

## 2025-04-09 SDOH — SOCIAL STABILITY: SOCIAL INSECURITY: WERE YOU ABLE TO COMPLETE ALL THE BEHAVIORAL HEALTH SCREENINGS?: YES

## 2025-04-09 SDOH — SOCIAL STABILITY: SOCIAL INSECURITY: HAVE YOU HAD THOUGHTS OF HARMING ANYONE ELSE?: NO

## 2025-04-09 ASSESSMENT — LIFESTYLE VARIABLES
AUDIT-C TOTAL SCORE: 0
TOTAL SCORE: 0
SKIP TO QUESTIONS 9-10: 1
HOW OFTEN DO YOU HAVE A DRINK CONTAINING ALCOHOL: NEVER
AUDIT-C TOTAL SCORE: 0
EVER FELT BAD OR GUILTY ABOUT YOUR DRINKING: NO
HAVE YOU EVER FELT YOU SHOULD CUT DOWN ON YOUR DRINKING: NO
PRESCIPTION_ABUSE_PAST_12_MONTHS: NO
EVER HAD A DRINK FIRST THING IN THE MORNING TO STEADY YOUR NERVES TO GET RID OF A HANGOVER: NO
HAVE PEOPLE ANNOYED YOU BY CRITICIZING YOUR DRINKING: NO
HOW OFTEN DO YOU HAVE 6 OR MORE DRINKS ON ONE OCCASION: NEVER
HOW MANY STANDARD DRINKS CONTAINING ALCOHOL DO YOU HAVE ON A TYPICAL DAY: PATIENT DOES NOT DRINK
SUBSTANCE_ABUSE_PAST_12_MONTHS: NO

## 2025-04-09 ASSESSMENT — PAIN - FUNCTIONAL ASSESSMENT
PAIN_FUNCTIONAL_ASSESSMENT: 0-10

## 2025-04-09 ASSESSMENT — ACTIVITIES OF DAILY LIVING (ADL)
GROOMING: INDEPENDENT
BATHING: INDEPENDENT
TOILETING: INDEPENDENT
WALKS IN HOME: INDEPENDENT
FEEDING YOURSELF: INDEPENDENT
PATIENT'S MEMORY ADEQUATE TO SAFELY COMPLETE DAILY ACTIVITIES?: YES
HEARING - RIGHT EAR: FUNCTIONAL
HEARING - LEFT EAR: FUNCTIONAL
LACK_OF_TRANSPORTATION: NO
JUDGMENT_ADEQUATE_SAFELY_COMPLETE_DAILY_ACTIVITIES: YES
ADEQUATE_TO_COMPLETE_ADL: YES
DRESSING YOURSELF: INDEPENDENT

## 2025-04-09 ASSESSMENT — COGNITIVE AND FUNCTIONAL STATUS - GENERAL
DAILY ACTIVITIY SCORE: 24
MOBILITY SCORE: 24
DAILY ACTIVITIY SCORE: 24
PATIENT BASELINE BEDBOUND: NO
MOBILITY SCORE: 24

## 2025-04-09 ASSESSMENT — PATIENT HEALTH QUESTIONNAIRE - PHQ9
SUM OF ALL RESPONSES TO PHQ9 QUESTIONS 1 & 2: 0
2. FEELING DOWN, DEPRESSED OR HOPELESS: NOT AT ALL
1. LITTLE INTEREST OR PLEASURE IN DOING THINGS: NOT AT ALL

## 2025-04-09 ASSESSMENT — PAIN SCALES - GENERAL
PAINLEVEL_OUTOF10: 4
PAINLEVEL_OUTOF10: 5 - MODERATE PAIN

## 2025-04-09 ASSESSMENT — PAIN DESCRIPTION - LOCATION: LOCATION: CHEST

## 2025-04-09 ASSESSMENT — PAIN DESCRIPTION - DESCRIPTORS
DESCRIPTORS: ACHING
DESCRIPTORS: PRESSURE
DESCRIPTORS: ACHING

## 2025-04-09 ASSESSMENT — PAIN DESCRIPTION - PAIN TYPE: TYPE: ACUTE PAIN

## 2025-04-09 NOTE — PROGRESS NOTES
Mateo Smith is a 65 y.o. male admitted for No Principal Problem: There is no principal problem currently on the Problem List. Please update the Problem List and refresh.. Pharmacy reviewed the patient's [id number] medications and allergies for accuracy.    The list below reflects the PTA list prior to pharmacy medication history. A summary a changes to the PTA medication list has been listed below. Please review each medication in order reconciliation for additional clarification and justification.    Source of information:  T2P  Medications added:    Medications modified:    Medications to be removed:  Ibuprofen-Apap 125/250mg  Medications of concern:      Prior to Admission Medications   Prescriptions Last Dose Informant Patient Reported? Taking?   acetaminophen (Tylenol) 500 mg tablet   Yes No   Sig: Take by mouth every 6 hours if needed for mild pain (1 - 3).   gabapentin (Neurontin) 300 mg capsule   No No   Sig: Patient will take gabapentin 300 mg in the morning and 900 mg at bedtime.   ibuprofen-acetaminophen 125-250 mg tablet per tablet   Yes No   Sig: Take by mouth.      Facility-Administered Medications: None       Laurita Calderon

## 2025-04-09 NOTE — ED TRIAGE NOTES
Pt to ER with c/o chest pain and shortness of breath that began today. Pt states he woke up at 0700 this morning with chest pain radiating into neck. EKG done in triage.

## 2025-04-09 NOTE — H&P
[facility] - GENERAL MEDICINE HISTORY AND PHYSICAL    HISTORY OF PRESENT ILLNESS     History Obtained From (Primary Source): patient  Collateral History (Secondary Sources): D/w ED, chart review    History Of Present Illness (HPI):  Mateo Smith is a 65 y.o. male with PMHx s/f spinal stenosis, BPH presenting with chest pain.  He states he was seen in the ED 3 weeks ago for chest pain.  Back then he was in his normal state of health when he started developing left-sided chest pain radiating to the axilla and back.  ED workup overall unrevealing with troponin negative and CTA negative for dissection.  He was previously discussed for admission but he declined and was referred for outpatient stress testing.  Today he presents with intermittent midsternal chest pressure radiating to neck and into his left axilla.  Described as dull aching pain.  He woke up with the pain around 7 AM and has about 20-minute episode of lightheadedness.  Has history of smoking 1 pack/week since he was a teenager.  Has not been following up with his PCP since he retired.  Denies history of HTN, CAD, diabetes. Denies f/c/n/v/d, sob, abd pain, syncope.     ED Course:   Vitals on presentation: T 37 °C (98.6 °F)  HR 73  /81  RR (!) 22  O2 100 % None (Room air)  Labs:   CBC, CMP, mag, BNP, troponin unremarkable  EKG: sinus rhythm 73 bpm, right bundle branch block and LAFB.  No acute STEMI.  , QTc 453.  Imaging - agree with radiology interpretation(s):   CXR-no acute process  Interventions: Aspirin 324 mg, Percocet 5/325, admission for further management    12-point ROS reviewed and found to be negative aside from aforementioned positives in HPI and/or noted in dedicated ROS section below.     Decision made to admit the patient to the hospitalist service after evaluation of the patient, review of the above, and discussion with ED provider.     LABS AND IMAGING     I have personally reviewed the following labs on  04/09/25: CBC, CMP, Mag, Troponin, and BNP  I have personally reviewed the following imaging studies on 04/09/25: CXR, with my personal interpretations as documented in ED course above.   I have personally reviewed any obtained EKGs on 04/09/25, with my interpretation as listed above in the ED summary course.   I have personally reviewed the patient's vitals on presentation to the ED and any/all changes through to time of admission (on 04/09/25).     ED Course (From ED Provider):  ED Course as of 04/09/25 1535   Wed Apr 09, 2025   1428 Patient agreeable with admission.  Pain improved with initial meds. [WJ]   1428 Independently reviewed the patient's chest x-ray did not note a obvious pneumothorax or opacity.  Radiology read is currently pending. [WJ]      ED Course User Index  [WJ] Raleigh Edwards,          Diagnoses as of 04/09/25 1535   Chest pain, unspecified type     Relevant Results  Results for orders placed or performed during the hospital encounter of 04/09/25 (from the past 24 hours)   CBC and Auto Differential   Result Value Ref Range    WBC 6.5 4.4 - 11.3 x10*3/uL    nRBC 0.0 0.0 - 0.0 /100 WBCs    RBC 4.96 4.50 - 5.90 x10*6/uL    Hemoglobin 14.5 13.5 - 17.5 g/dL    Hematocrit 44.0 41.0 - 52.0 %    MCV 89 80 - 100 fL    MCH 29.2 26.0 - 34.0 pg    MCHC 33.0 32.0 - 36.0 g/dL    RDW 13.1 11.5 - 14.5 %    Platelets 207 150 - 450 x10*3/uL    Neutrophils % 57.2 40.0 - 80.0 %    Immature Granulocytes %, Automated 0.2 0.0 - 0.9 %    Lymphocytes % 34.6 13.0 - 44.0 %    Monocytes % 7.8 2.0 - 10.0 %    Eosinophils % 0.0 0.0 - 6.0 %    Basophils % 0.2 0.0 - 2.0 %    Neutrophils Absolute 3.70 1.20 - 7.70 x10*3/uL    Immature Granulocytes Absolute, Automated 0.01 0.00 - 0.70 x10*3/uL    Lymphocytes Absolute 2.23 1.20 - 4.80 x10*3/uL    Monocytes Absolute 0.50 0.10 - 1.00 x10*3/uL    Eosinophils Absolute 0.00 0.00 - 0.70 x10*3/uL    Basophils Absolute 0.01 0.00 - 0.10 x10*3/uL   Comprehensive Metabolic Panel   Result  Value Ref Range    Glucose 97 74 - 99 mg/dL    Sodium 140 136 - 145 mmol/L    Potassium 4.3 3.5 - 5.3 mmol/L    Chloride 105 98 - 107 mmol/L    Bicarbonate 27 21 - 32 mmol/L    Anion Gap 12 10 - 20 mmol/L    Urea Nitrogen 13 6 - 23 mg/dL    Creatinine 0.80 0.50 - 1.30 mg/dL    eGFR >90 >60 mL/min/1.73m*2    Calcium 10.2 8.6 - 10.3 mg/dL    Albumin 4.9 3.4 - 5.0 g/dL    Alkaline Phosphatase 62 33 - 136 U/L    Total Protein 7.6 6.4 - 8.2 g/dL    AST 19 9 - 39 U/L    Bilirubin, Total 1.1 0.0 - 1.2 mg/dL    ALT 15 10 - 52 U/L   Magnesium   Result Value Ref Range    Magnesium 1.89 1.60 - 2.40 mg/dL   B-Type Natriuretic Peptide   Result Value Ref Range    BNP 16 0 - 99 pg/mL   Troponin I, High Sensitivity, Initial   Result Value Ref Range    Troponin I, High Sensitivity 6 0 - 20 ng/L   Troponin, High Sensitivity, 1 Hour   Result Value Ref Range    Troponin I, High Sensitivity 6 0 - 20 ng/L      Imaging  XR chest 1 view    Result Date: 4/9/2025  No acute process. Signed by Clemente Ward MD     Cardiology, Vascular, and Other Imaging  No other imaging results found for the past 2 days       PAST HISTORIES AND ALLERGIES     Past Medical History  He has a past medical history of Personal history of other diseases of the digestive system and Skull fracture (Multi).    Surgical History  He has a past surgical history that includes Other surgical history (06/27/2019) and Other surgical history (06/27/2019).     Social History  He reports that he has been smoking cigarettes. He does not have any smokeless tobacco history on file. He reports that he does not currently use alcohol. He reports current drug use. Drug: Marijuana.    Family History  No family history on file.    Allergies  Patient has no known allergies.    MEDICATIONS     Scheduled Medications:  enoxaparin, 40 mg, subcutaneous, q24h  [START ON 4/10/2025] pantoprazole, 40 mg, oral, Daily before breakfast   Or  [START ON 4/10/2025] pantoprazole, 40 mg, intravenous,  Daily before breakfast      Continuous Medications:     PRN Medications:  PRN medications: acetaminophen, guaiFENesin, melatonin, ondansetron, polyethylene glycol     REVIEW OF SYSTEMS     Review of Systems    OBJECTIVE     Last Recorded Vitals  /90   Pulse 62   Temp 37 °C (98.6 °F) (Tympanic)   Resp 16   Wt 68 kg (150 lb)   SpO2 99%      Physical Exam:  Vital signs and nursing notes reviewed.   Constitutional: Pleasant and cooperative. Laying in bed in no acute distress. Conversant.   Skin: Warm and dry; no obvious lesions, rashes, pallor, or jaundice.   Eyes: EOMI. Anicteric sclera.   ENT: Mucous membranes moist; no obvious injury or deformity appreciated.   Head and Neck: Normocephalic, atraumatic. ROM preserved. Trachea midline. No appreciable JVD.   Respiratory: Nonlabored on RA. Lungs clear to auscultation bilaterally without obvious adventitious sounds. Chest rise is equal.  Cardiovascular: RRR. No gross murmur, gallop, or rub. Extremities are warm and well-perfused with good capillary refill (< 3 seconds). No chest wall tenderness.   GI: Abdomen soft, nontender, nondistended. No obvious organomegaly appreciated. Bowel sounds are present.  : No CVA tenderness.   MSK: No gross abnormalities appreciated. No limitations to AROM/PROM appreciated.   Extremities: No cyanosis, edema, or clubbing evident. Neurovascularly intact.   Neuro: A&Ox3. CN 2-12 grossly intact. Able to respond to questions appropriately and clearly. No acute focal neurologic deficits appreciated.  Psych: Appropriate mood and behavior.    ASSESSMENT AND PLAN   Assessment/Plan     65 y.o. male with PMHx s/f spinal stenosis, BPH presenting with chest pain.    Chest pain  -troponin and EKG negative  -HEART score of 4  -lipid panel, A1c, TSH pending  -plan for stress echo tomorrow  -telemetry monitoring    Spinal stenosis  Chronic low back pain  -continue follow-up with pain management  -continue home gabapentin    Diet: Regular  DVT  Prophylaxis: SCDs, Lovenox   Code Status: Full Code   Case Discussed With: ED provider  Additional Sources Reviewed: ED note day of admission; ED note 3 weeks ago    Anticipated Length of Stay (LOS): Patient will require overnight (one midnight) stay for further evaluation and management.     Savannah Osman PA-C    Dragon dictation software was used to dictate this note and thus there may be minor errors in translation/transcription including garbled speech or misspellings. Please contact for clarification if needed.

## 2025-04-09 NOTE — ED PROVIDER NOTES
HPI   Chief Complaint   Patient presents with    Chest Pain    Shortness of Breath       HPI    HISTORY OF PRESENT ILLNESS:  Patient Is a 65-year-old male current cigarette smoker with chronic back pain presenting to the emergency department for chest and back pain.  The patient states that he woke up today at 7 AM was having chest pain associated shortness of breath, 20-minute episode of lightheadedness.  He persistently afterwards continued to have this chest and back discomfort with radiation to the left jaw.  The patient states that he has been dealing with ongoing back pain since Thanksgiving.  He was seen 3 weeks ago and had imaging performed.    Past Medical History: Cigarette smoking, chronic back pain  Family History: family history not pertinent to presenting problem or chief complaint.  Patient denied any family history of early heart attacks or unexpected deaths  Social History: Current cigarette smoking    __________________________________________________________  PHYSICAL EXAM:    Appearance: Alert, oriented , cooperative   Skin: Intact,  dry skin, no lesions, rash, petechiae or purpura.   Eyes: PERRLA, EOMs intact,  Conjunctiva pink with no redness or exudates.    HENT: Normocephalic, atraumatic. Nares patent   Neck: Supple. Trachea at midline.   Pulmonary: Lung sounds are clear bilaterally.  There is no rales, rhonchi, or wheezing.  Cardiac: Regular rate and rhythm, no rubs, murmurs, or gallops. No JVD,   Abdomen: Abdomen is soft, nontender, and nondistended.  No palpable organomegaly.  No rebound or guarding.  No CVA tenderness. Nonsurgical abdomen  Genitourinary: Exam deferred.  Musculoskeletal: no edema, pain, cyanosis, or deformity in extremities. Pulses full and equal.   Neurological:  Cranial nerves are grossly intact, grossly normal sensation, no weakness, no focal findings identified.    __________________________________________________________  MEDICAL DECISION MAKING:    Patient was seen  and examined. Differential diagnosis for chest pain includes ACS, pneumonia, pneumothorax, arrhythmia, heart failure to name a few.  Patient has been having chest pain with radiation to the jaw.  He was seen recently with similar symptoms and underwent a CT angio which did not show signs of aneurysm.  Patient is a current cigarette smoker.  He has not yet followed up with a stress test.  Cardiac labs obtained.  Troponins were negative x 2.  I discussed with the patient possibility of getting a stress test given that he had not yet followed up for this.  After risk-benefit discussion, he was agreeable.  Case discussed with [facility] and patient was admitted.        Chronic Medical Conditions Significantly Affecting Care: Chronic low back pain    External Records Reviewed: I reviewed recent and relevant outside records including: Pain management note from April 2, 2025    Patient twelve-lead EKG interpreted by myself shows sinus parenchymatous rate 73, left axis, right bundle branch and left anterior fascicular block present, normal KS interval, normal QRS duration, no ST changes when compared to prior EKG from March 17, 2025.    Raleigh Edwards  Emergency Medicine    Patient History   Past Medical History:   Diagnosis Date    Personal history of other diseases of the digestive system     History of gastroesophageal reflux (GERD)    Skull fracture (Multi)      Past Surgical History:   Procedure Laterality Date    OTHER SURGICAL HISTORY  06/27/2019    Cystoscopy    OTHER SURGICAL HISTORY  06/27/2019    Urethral dilation     No family history on file.  Social History     Tobacco Use    Smoking status: Every Day     Current packs/day: 0.25     Types: Cigarettes    Smokeless tobacco: Not on file   Substance Use Topics    Alcohol use: Not Currently    Drug use: Yes     Types: Marijuana     Comment: yesterday       Physical Exam   ED Triage Vitals [04/09/25 1240]   Temperature Heart Rate Respirations BP   37 °C (98.6 °F) 73 (!) 22  155/81      Pulse Ox Temp Source Heart Rate Source Patient Position   100 % Tympanic -- Sitting      BP Location FiO2 (%)     Left arm --       Physical Exam      ED Course & [facility]   ED Course as of 04/11/25 1545   Wed Apr 09, 2025   1428 Patient agreeable with admission.  Pain improved with initial meds. [WJ]   1428 Independently reviewed the patient's chest x-ray did not note a obvious pneumothorax or opacity.  Radiology read is currently pending. [WJ]      ED Course User Index  [WJ] Raleigh Edwards DO         Diagnoses as of 04/11/25 1545   Chest pain, unspecified type                 No data recorded                                 Medical Decision Making      Procedure  Procedures     Raleigh Edwards DO  04/11/25 1546

## 2025-04-10 ENCOUNTER — APPOINTMENT (OUTPATIENT)
Dept: CARDIOLOGY | Facility: HOSPITAL | Age: 65
End: 2025-04-10
Payer: MEDICARE

## 2025-04-10 VITALS
RESPIRATION RATE: 16 BRPM | HEIGHT: 67 IN | WEIGHT: 150 LBS | DIASTOLIC BLOOD PRESSURE: 72 MMHG | TEMPERATURE: 98 F | SYSTOLIC BLOOD PRESSURE: 120 MMHG | HEART RATE: 66 BPM | OXYGEN SATURATION: 99 % | BODY MASS INDEX: 23.54 KG/M2

## 2025-04-10 LAB
ALBUMIN SERPL BCP-MCNC: 4 G/DL (ref 3.4–5)
ALP SERPL-CCNC: 52 U/L (ref 33–136)
ALT SERPL W P-5'-P-CCNC: 12 U/L (ref 10–52)
ANION GAP SERPL CALC-SCNC: 10 MMOL/L (ref 10–20)
AST SERPL W P-5'-P-CCNC: 16 U/L (ref 9–39)
BILIRUB SERPL-MCNC: 0.9 MG/DL (ref 0–1.2)
BUN SERPL-MCNC: 18 MG/DL (ref 6–23)
CALCIUM SERPL-MCNC: 9 MG/DL (ref 8.6–10.3)
CHLORIDE SERPL-SCNC: 107 MMOL/L (ref 98–107)
CHOLEST SERPL-MCNC: 151 MG/DL (ref 0–199)
CHOLESTEROL/HDL RATIO: 3.8
CO2 SERPL-SCNC: 26 MMOL/L (ref 21–32)
CREAT SERPL-MCNC: 0.7 MG/DL (ref 0.5–1.3)
EGFRCR SERPLBLD CKD-EPI 2021: >90 ML/MIN/1.73M*2
ERYTHROCYTE [DISTWIDTH] IN BLOOD BY AUTOMATED COUNT: 13.2 % (ref 11.5–14.5)
EST. AVERAGE GLUCOSE BLD GHB EST-MCNC: 114 MG/DL
GLUCOSE SERPL-MCNC: 86 MG/DL (ref 74–99)
HBA1C MFR BLD: 5.6 %
HCT VFR BLD AUTO: 38.2 % (ref 41–52)
HDLC SERPL-MCNC: 40.1 MG/DL
HGB BLD-MCNC: 12.6 G/DL (ref 13.5–17.5)
LDLC SERPL CALC-MCNC: 82 MG/DL
MCH RBC QN AUTO: 30 PG (ref 26–34)
MCHC RBC AUTO-ENTMCNC: 33 G/DL (ref 32–36)
MCV RBC AUTO: 91 FL (ref 80–100)
NON HDL CHOLESTEROL: 111 MG/DL (ref 0–149)
NRBC BLD-RTO: 0 /100 WBCS (ref 0–0)
PLATELET # BLD AUTO: 164 X10*3/UL (ref 150–450)
POTASSIUM SERPL-SCNC: 4.4 MMOL/L (ref 3.5–5.3)
PROT SERPL-MCNC: 6 G/DL (ref 6.4–8.2)
RBC # BLD AUTO: 4.2 X10*6/UL (ref 4.5–5.9)
SODIUM SERPL-SCNC: 139 MMOL/L (ref 136–145)
TRIGL SERPL-MCNC: 143 MG/DL (ref 0–149)
TSH SERPL-ACNC: 1.55 MIU/L (ref 0.44–3.98)
VLDL: 29 MG/DL (ref 0–40)
WBC # BLD AUTO: 5.3 X10*3/UL (ref 4.4–11.3)

## 2025-04-10 PROCEDURE — G0378 HOSPITAL OBSERVATION PER HR: HCPCS

## 2025-04-10 PROCEDURE — 80061 LIPID PANEL: CPT

## 2025-04-10 PROCEDURE — 80053 COMPREHEN METABOLIC PANEL: CPT

## 2025-04-10 PROCEDURE — 2500000001 HC RX 250 WO HCPCS SELF ADMINISTERED DRUGS (ALT 637 FOR MEDICARE OP): Performed by: INTERNAL MEDICINE

## 2025-04-10 PROCEDURE — 84443 ASSAY THYROID STIM HORMONE: CPT

## 2025-04-10 PROCEDURE — 96375 TX/PRO/DX INJ NEW DRUG ADDON: CPT | Mod: 59

## 2025-04-10 PROCEDURE — 96374 THER/PROPH/DIAG INJ IV PUSH: CPT | Mod: 59

## 2025-04-10 PROCEDURE — 83036 HEMOGLOBIN GLYCOSYLATED A1C: CPT | Mod: PORLAB

## 2025-04-10 PROCEDURE — 2500000004 HC RX 250 GENERAL PHARMACY W/ HCPCS (ALT 636 FOR OP/ED)

## 2025-04-10 PROCEDURE — 99238 HOSP IP/OBS DSCHRG MGMT 30/<: CPT | Performed by: INTERNAL MEDICINE

## 2025-04-10 PROCEDURE — 93018 CV STRESS TEST I&R ONLY: CPT | Performed by: INTERNAL MEDICINE

## 2025-04-10 PROCEDURE — 93350 STRESS TTE ONLY: CPT | Performed by: INTERNAL MEDICINE

## 2025-04-10 PROCEDURE — 93016 CV STRESS TEST SUPVJ ONLY: CPT | Performed by: INTERNAL MEDICINE

## 2025-04-10 PROCEDURE — 85027 COMPLETE CBC AUTOMATED: CPT

## 2025-04-10 PROCEDURE — 36415 COLL VENOUS BLD VENIPUNCTURE: CPT

## 2025-04-10 PROCEDURE — 93017 CV STRESS TEST TRACING ONLY: CPT

## 2025-04-10 RX ORDER — IBUPROFEN 400 MG/1
400 TABLET ORAL EVERY 6 HOURS PRN
Qty: 20 TABLET | Refills: 0 | Status: SHIPPED | OUTPATIENT
Start: 2025-04-10

## 2025-04-10 RX ORDER — GABAPENTIN 300 MG/1
300 CAPSULE ORAL EVERY MORNING
Status: DISCONTINUED | OUTPATIENT
Start: 2025-04-10 | End: 2025-04-10 | Stop reason: HOSPADM

## 2025-04-10 RX ORDER — ATROPINE SULFATE 0.4 MG/ML
.25-2 INJECTION, SOLUTION ENDOTRACHEAL; INTRAMEDULLARY; INTRAMUSCULAR; INTRAVENOUS; SUBCUTANEOUS
OUTPATIENT
Start: 2025-04-10

## 2025-04-10 RX ORDER — METOPROLOL TARTRATE 1 MG/ML
5 INJECTION, SOLUTION INTRAVENOUS ONCE
Status: DISCONTINUED | OUTPATIENT
Start: 2025-04-10 | End: 2025-04-10 | Stop reason: HOSPADM

## 2025-04-10 RX ORDER — CYCLOBENZAPRINE HCL 5 MG
5 TABLET ORAL 3 TIMES DAILY PRN
Qty: 30 TABLET | Refills: 0 | Status: SHIPPED | OUTPATIENT
Start: 2025-04-10

## 2025-04-10 RX ORDER — GABAPENTIN 300 MG/1
900 CAPSULE ORAL NIGHTLY
Status: DISCONTINUED | OUTPATIENT
Start: 2025-04-10 | End: 2025-04-10 | Stop reason: HOSPADM

## 2025-04-10 RX ADMIN — GABAPENTIN 900 MG: 300 CAPSULE ORAL at 00:28

## 2025-04-10 RX ADMIN — DOBUTAMINE HYDROCHLORIDE 30 MCG/KG/MIN: 100 INJECTION INTRAVENOUS at 11:04

## 2025-04-10 RX ADMIN — MORPHINE SULFATE 1 MG: 2 INJECTION, SOLUTION INTRAMUSCULAR; INTRAVENOUS at 00:28

## 2025-04-10 RX ADMIN — PANTOPRAZOLE SODIUM 40 MG: 40 INJECTION, POWDER, FOR SOLUTION INTRAVENOUS at 08:28

## 2025-04-10 RX ADMIN — GABAPENTIN 300 MG: 300 CAPSULE ORAL at 08:28

## 2025-04-10 RX ADMIN — ATROPINE SULFATE 0.5 MG: 0.1 INJECTION, SOLUTION ENDOTRACHEAL; INTRAMUSCULAR; INTRAVENOUS; SUBCUTANEOUS at 11:04

## 2025-04-10 SDOH — ECONOMIC STABILITY: FOOD INSECURITY: WITHIN THE PAST 12 MONTHS, THE FOOD YOU BOUGHT JUST DIDN'T LAST AND YOU DIDN'T HAVE MONEY TO GET MORE.: NEVER TRUE

## 2025-04-10 SDOH — SOCIAL STABILITY: SOCIAL INSECURITY
WITHIN THE LAST YEAR, HAVE YOU BEEN KICKED, HIT, SLAPPED, OR OTHERWISE PHYSICALLY HURT BY YOUR PARTNER OR EX-PARTNER?: NO

## 2025-04-10 SDOH — ECONOMIC STABILITY: FOOD INSECURITY: WITHIN THE PAST 12 MONTHS, YOU WORRIED THAT YOUR FOOD WOULD RUN OUT BEFORE YOU GOT THE MONEY TO BUY MORE.: NEVER TRUE

## 2025-04-10 SDOH — ECONOMIC STABILITY: INCOME INSECURITY: IN THE PAST 12 MONTHS HAS THE ELECTRIC, GAS, OIL, OR WATER COMPANY THREATENED TO SHUT OFF SERVICES IN YOUR HOME?: NO

## 2025-04-10 SDOH — SOCIAL STABILITY: SOCIAL INSECURITY: WITHIN THE LAST YEAR, HAVE YOU BEEN HUMILIATED OR EMOTIONALLY ABUSED IN OTHER WAYS BY YOUR PARTNER OR EX-PARTNER?: NO

## 2025-04-10 SDOH — SOCIAL STABILITY: SOCIAL INSECURITY
WITHIN THE LAST YEAR, HAVE YOU BEEN RAPED OR FORCED TO HAVE ANY KIND OF SEXUAL ACTIVITY BY YOUR PARTNER OR EX-PARTNER?: NO

## 2025-04-10 SDOH — SOCIAL STABILITY: SOCIAL INSECURITY: WITHIN THE LAST YEAR, HAVE YOU BEEN AFRAID OF YOUR PARTNER OR EX-PARTNER?: NO

## 2025-04-10 ASSESSMENT — PAIN DESCRIPTION - LOCATION: LOCATION: CHEST

## 2025-04-10 ASSESSMENT — PAIN - FUNCTIONAL ASSESSMENT
PAIN_FUNCTIONAL_ASSESSMENT: 0-10
PAIN_FUNCTIONAL_ASSESSMENT: 0-10

## 2025-04-10 ASSESSMENT — PAIN SCALES - GENERAL
PAINLEVEL_OUTOF10: 0 - NO PAIN
PAINLEVEL_OUTOF10: 0 - NO PAIN
PAINLEVEL_OUTOF10: 6

## 2025-04-10 ASSESSMENT — PAIN DESCRIPTION - DESCRIPTORS: DESCRIPTORS: PRESSURE

## 2025-04-10 NOTE — DISCHARGE SUMMARY
Discharge Diagnosis  Chest pain, unspecified type    Issues Requiring Follow-Up  Follow-up with primary care provider and pain management as outpatient    Discharge Meds     Medication List      START taking these medications     cyclobenzaprine 5 mg tablet; Commonly known as: Flexeril; Take 1 tablet   (5 mg) by mouth 3 times a day as needed for muscle spasms.   ibuprofen 400 mg tablet; Take 1 tablet (400 mg) by mouth every 6 hours   if needed for moderate pain (4 - 6). Take it with food and never on an   empty stomach.     CONTINUE taking these medications     acetaminophen 500 mg tablet; Commonly known as: Tylenol   gabapentin 300 mg capsule; Commonly known as: Neurontin; Patient will   take gabapentin 300 mg in the morning and 900 mg at bedtime.       Test Results Pending At Discharge  Pending Labs       No current pending labs.            Hospital Course  65-year-old male with past medical history of spinal stenosis, BPH presented for chest pain.  Pain has been going on for a while and suddenly noticed his left axillary pain becoming of more left anterior pain.  Patient was seen and evaluated in the emergency department and had a workup that no ischemic changes on EKG with negative troponin.  Pain is reproducible on physical examination.  Patient had negative stress test in the hospital.  He will be discharged home with muscle relaxer and ibuprofen as needed.  He already has a MRI scheduled with pain management for lumbar spine.  If pain is persistent he should see PCP/pain management for further workup.    28 minutes spent in discharge timing    Pertinent Physical Exam At Time of Discharge  General: Not in acute distress, alert  HEENT: PERRLA, head intact and normocephalic  Neck: Normal to inspection  Lungs: Clear to auscultation, work of breathing within normal limit  Cardiac: Regular rate and rhythm.  Reproducible pain on palpation of the chest that patient is experiencing similarly at the anterior rib cage  area  Abdomen: Soft nontender, positive bowel sounds  : Exam deferred  Skin: Intact  Hematology: No petechia or excessive ecchymosis  Musculoskeletal: Without significant trauma  Neurological: Alert awake oriented, no focal deficit, cranial nerves grossly intact  Psych: No suicidal ideation or homicidal ideation    Outpatient Follow-Up  Future Appointments   Date Time Provider Department Center   4/19/2025 12:15 PM POR MRI PORMRI [facility]   5/8/2025 10:00 AM Lisa Kumar, APRN-CNP, APRN-CNS PORPNM [facility]   5/13/2025 10:45 AM Pee Torres MD [id number] [facility]         Zaki Cornejo MD

## 2025-04-10 NOTE — CARE PLAN
The clinical goals for the shift include Pt will state pain 4/10 or less by end of shift      Problem: Pain - Adult  Goal: Verbalizes/displays adequate comfort level or baseline comfort level  Outcome: Progressing     Problem: Safety - Adult  Goal: Free from fall injury  Outcome: Progressing     Problem: Discharge Planning  Goal: Discharge to home or other facility with appropriate resources  Outcome: Progressing     Problem: Chronic Conditions and Co-morbidities  Goal: Patient's chronic conditions and co-morbidity symptoms are monitored and maintained or improved  Outcome: Progressing     Problem: Nutrition  Goal: Nutrient intake appropriate for maintaining nutritional needs  Outcome: Progressing     Problem: Pain  Goal: Takes deep breaths with improved pain control throughout the shift  Outcome: Progressing  Goal: Turns in bed with improved pain control throughout the shift  Outcome: Progressing  Goal: Walks with improved pain control throughout the shift  Outcome: Progressing  Goal: Performs ADL's with improved pain control throughout shift  Outcome: Progressing  Goal: Participates in PT with improved pain control throughout the shift  Outcome: Progressing  Goal: Free from opioid side effects throughout the shift  Outcome: Progressing  Goal: Free from acute confusion related to pain meds throughout the shift  Outcome: Progressing

## 2025-04-10 NOTE — CARE PLAN
The patient's goals for the shift include      The clinical goals for the shift include pt safety and comfort.

## 2025-04-10 NOTE — HOSPITAL COURSE
65-year-old male with past medical history of spinal stenosis, BPH presented for chest pain.  Pain has been going on for a while and suddenly noticed his left axillary pain becoming of more left anterior pain.  Patient was seen and evaluated in the emergency department and had a workup that no ischemic changes on EKG with negative troponin.  Pain is reproducible on physical examination.  Once her stress test is negative patient will be discharged home with muscle relaxer and ibuprofen as needed.  He already has a MRI scheduled with pain management for lumbar spine.  If pain is persistent he should see PCP/pain management for further workup.    28 minutes spent in discharge timing

## 2025-04-16 LAB
ATRIAL RATE: 63 BPM
ATRIAL RATE: 72 BPM
P AXIS: 48 DEGREES
P AXIS: 75 DEGREES
PR INTERVAL: 149 MS
PR INTERVAL: 161 MS
Q ONSET: 249 MS
Q ONSET: 249 MS
QRS COUNT: 10 BEATS
QRS COUNT: 12 BEATS
QRS DURATION: 151 MS
QRS DURATION: 154 MS
QT INTERVAL: 411 MS
QT INTERVAL: 433 MS
QTC CALCULATION(BAZETT): 440 MS
QTC CALCULATION(BAZETT): 453 MS
QTC FREDERICIA: 438 MS
QTC FREDERICIA: 439 MS
R AXIS: -70 DEGREES
R AXIS: -74 DEGREES
T AXIS: 55 DEGREES
T AXIS: 57 DEGREES
T OFFSET: 455 MS
T OFFSET: 466 MS
VENTRICULAR RATE: 62 BPM
VENTRICULAR RATE: 73 BPM

## 2025-04-19 ENCOUNTER — HOSPITAL ENCOUNTER (OUTPATIENT)
Dept: RADIOLOGY | Facility: HOSPITAL | Age: 65
Discharge: HOME | End: 2025-04-19
Payer: MEDICARE

## 2025-04-19 DIAGNOSIS — M54.16 LUMBAR RADICULOPATHY: ICD-10-CM

## 2025-04-19 DIAGNOSIS — M48.062 SPINAL STENOSIS OF LUMBAR REGION WITH NEUROGENIC CLAUDICATION: ICD-10-CM

## 2025-04-19 PROCEDURE — 72148 MRI LUMBAR SPINE W/O DYE: CPT

## 2025-04-19 PROCEDURE — 72148 MRI LUMBAR SPINE W/O DYE: CPT | Performed by: RADIOLOGY

## 2025-05-01 ENCOUNTER — DOCUMENTATION (OUTPATIENT)
Dept: PHYSICAL THERAPY | Facility: HOSPITAL | Age: 65
End: 2025-05-01
Payer: MEDICARE

## 2025-05-01 NOTE — PROGRESS NOTES
Physical Therapy    Discharge Summary    Name: Mateo Smith  MRN: [id number]  : 1960  Date: 25    Discharge Summary: PT    Date of PT eval 25  Date of last PT visit [facility] 3-19-25  Total visit #5  Date of Discharge 25  Reason for Discharge: Pt has not been to PT since 3-19-25. PT plan of care

## 2025-05-08 ENCOUNTER — OFFICE VISIT (OUTPATIENT)
Dept: PAIN MEDICINE | Facility: HOSPITAL | Age: 65
End: 2025-05-08
Payer: MEDICARE

## 2025-05-08 VITALS
DIASTOLIC BLOOD PRESSURE: 89 MMHG | WEIGHT: 148.3 LBS | RESPIRATION RATE: 18 BRPM | SYSTOLIC BLOOD PRESSURE: 145 MMHG | BODY MASS INDEX: 23.28 KG/M2 | OXYGEN SATURATION: 98 % | HEIGHT: 67 IN | HEART RATE: 75 BPM

## 2025-05-08 DIAGNOSIS — M47.816 LUMBAR SPONDYLOSIS: ICD-10-CM

## 2025-05-08 DIAGNOSIS — M48.062 SPINAL STENOSIS OF LUMBAR REGION WITH NEUROGENIC CLAUDICATION: ICD-10-CM

## 2025-05-08 PROCEDURE — 3008F BODY MASS INDEX DOCD: CPT | Performed by: CLINICAL NURSE SPECIALIST

## 2025-05-08 PROCEDURE — 1160F RVW MEDS BY RX/DR IN RCRD: CPT | Performed by: CLINICAL NURSE SPECIALIST

## 2025-05-08 PROCEDURE — G2211 COMPLEX E/M VISIT ADD ON: HCPCS | Performed by: CLINICAL NURSE SPECIALIST

## 2025-05-08 PROCEDURE — 1125F AMNT PAIN NOTED PAIN PRSNT: CPT | Performed by: CLINICAL NURSE SPECIALIST

## 2025-05-08 PROCEDURE — 1159F MED LIST DOCD IN RCRD: CPT | Performed by: CLINICAL NURSE SPECIALIST

## 2025-05-08 PROCEDURE — 99214 OFFICE O/P EST MOD 30 MIN: CPT | Performed by: CLINICAL NURSE SPECIALIST

## 2025-05-08 RX ORDER — GABAPENTIN 300 MG/1
CAPSULE ORAL
Qty: 120 CAPSULE | Refills: 2 | Status: SHIPPED | OUTPATIENT
Start: 2025-05-08

## 2025-05-08 ASSESSMENT — ENCOUNTER SYMPTOMS
OCCASIONAL FEELINGS OF UNSTEADINESS: 1
DEPRESSION: 0
LOSS OF SENSATION IN FEET: 1

## 2025-05-08 ASSESSMENT — PATIENT HEALTH QUESTIONNAIRE - PHQ9
1. LITTLE INTEREST OR PLEASURE IN DOING THINGS: NOT AT ALL
2. FEELING DOWN, DEPRESSED OR HOPELESS: NOT AT ALL
SUM OF ALL RESPONSES TO PHQ9 QUESTIONS 1 AND 2: 0

## 2025-05-08 ASSESSMENT — PAIN SCALES - GENERAL: PAINLEVEL_OUTOF10: 7

## 2025-05-08 NOTE — PROGRESS NOTES
Follow Up Visit    Location of Pain: low back pain-primarily left side, occasional right side. Describes as throbbing. Radiates bilat leg to toes. Has numbness/tingling  Here for MRI results 4/19/25       Pain Score: 7/10    Other pain medication/neuromodulator: tylenol prn, gabapentin-helping    Therapeutic Goals:    Injections and/or Procedures: MIN 2/21/25-did not help    Other: PT in March-April, also pool therapy-did not help  OA: 1/30/25      Subjective   Patient ID: Mateo Smith is a 65 y.o. male who presents for lumbar radicular pain.    HPI    65-year-old male presenting with history of low back pain accompanied by radiculopathy.  Symptoms began in November 2024 after he was lifting his dog onto a bed and he felt a sharp pain.  He has noted an increase in low back symptoms.  He was seen by a chiropractor for several months with no relief.  No prior history of lumbar surgery.  Received limited relief with an interlaminar lumbar epidural steroid injection.  He has done physical therapy multiple times with limited relief.  States that his most recent physical therapy increased his pain.  At that point the patient was sent for a lumbar MRI.  Continued on gabapentin 300 mg in the morning and 900 mg at bedtime.  Presenting at today's office visit for evaluation after recent lumbar MRI.  Lumbar MRI consistent with right herniated disc at L3-4 with  right-sided foraminal narrowing; severe bilateral foraminal narrowing at L4-5.  Continues to experience low back pain with radiation into bilateral hips and bilateral lower extremities to the level of his feet left greater than right.  He is endorsing lower extremity numbness and tingling from his thighs through his toes.  Endorses persistent lower extremity weakness.  Continues to experience urinary hesitancy which he states has not changed from his prior exam. Denies any difficulty with bowel function.  Describes his pain as throbbing and aching.  Pain increases  with standing, bending, twisting.  He states that all activity increases his pain.  Sleep is difficult secondary to increasing pain.  Patient was evaluated for recurrent chest pain and hospitalized for several days.  Cardiac testing essentially negative.  He states he will continue to follow-up with his PCP.  He currently is tolerating gabapentin 300 mg in the morning and 900 mg at bedtime.  States that he feels this medication has added additional pain relief.  He will supplement with Tylenol and occasional Advil.    OARRS:  Lisa Kumar, APRN-CNP, APRN-CNS on 5/8/2025 10:59 AM  I have personally reviewed the OARRS report for Mateo Smith. I have considered the risks of abuse, dependence, addiction and diversion      Office Agreement: 1/30/25      Review of Systems    ROS:   General: No fevers, chills, weight loss  Skin: Negative for lesions  Eyes: No acute vision changes  Ears: No vertigo  Nose, mouth, throat: No difficulty swallowing or speaking  Respiratory: No cough, shortness of breath, cyanosis  Cardiovascular: Negative for chest pain syncope or palpitation  Gastrointestinal: No constipation, nausea, vomiting  Neurological: Negative for headache, positive for: Paresthesia and weakness  Psychological: Negative for severe or debilitating anxiety, depression. Negative memory loss  Musculoskeletal: Positive for arthralgia, myalgia, pain  Endocrine: Negative for weight gain, appetite changes, excessive sweating  Allergy/immune: Negative    All 13 systems were reviewed and are within normal levels except as noted or in the history of present illness.  Positive or pertinent negative responses are noted or were in the history of present illness. As noted, the patient denies significant or impairing weakness in the bilateral upper and lower extremities, medication induced constipation, and bowel or bladder incontinence.   Current Medications[1]     Medical History[2]     Surgical History[3]     Family History[4]  "    RX Allergies[5]     Objective     Visit Vitals  /89   Pulse 75   Resp 18   Ht 1.702 m (5' 7\")   Wt 67.3 kg (148 lb 4.8 oz)   SpO2 98%   BMI 23.23 kg/m²   Smoking Status Every Day   BSA 1.78 m²        Physical Exam    PE:  General: Well-developed, well-nourished, no acute distress. The patient demonstrates no pain behavior, symptom magnification or overt drug-seeking behavior.  Eye: Pupils appropriate for room lighting  Neck/thyroid: No obvious goiter or enlargement of neck noted  Respiratory exam: Normal respiratory effort, unlabored respiration. No accessory muscle use noted  Cardiac exam: Bilateral radial pulses intact  Abdominal: Nondistended  Spine, lumbar: The patient is able to rise from a seated to standing position without hesitancy, push off, or delay. Gait is grossly nonantalgic. Tenderness to paraspinous musculature is noted lower lumbar region bilaterally.  Flexion and extension limited secondary to stiffness and pain.  Positive straight leg raise bilateral lower extremities.  Neurologic exam: Muscle strength is antigravity in all 4 extremities.  Decreased motor strength left lower extremity 4/5.  Normal motor strength right lower extremity 5/5.  Patient is able to dorsiflex and plantarflex without difficulty bilaterally.  Psychiatric exam: Judgment and insight normal, affect normal, speech is fluent, affect appropriate, demonstrating no signs of hypersomnolence, sedation, or confusion     Assessment/Plan   Problem List Items Addressed This Visit           ICD-10-CM    Spinal stenosis of lumbar region with neurogenic claudication M48.062    65-year-old male presents for follow-up of lower back pain radiating into bilateral lower extremities with claudication symptoms.   CT scan of his lumbar spine showing degenerative changes with some grade 1 anterolisthesis of L4 and L5 and some ligamentum flavum hypertrophy causing some central canal stenosis at that level as well as foraminal stenosis.  " There was some question of the CT scan about a laminectomy on the right however patient has not had any type of back surgery.  Previous chiropractic treatment did not provide relief.  Interlaminar lumbar epidural steroid injection at L5-S1 provided limited relief for several days.  At that point patient was sent for lumbar MRI.  Presenting at today's office visit with persistent low back pain radiating to bilateral lower extremities.  Pain accompanied by significant numbness/tingling and weakness.  He is experiencing some evidence of urinary hesitancy which has not changed since his previous exam. Reviewed lumbar MRI consistent with right herniated disc at L3-4 with right-sided foraminal narrowing; severe bilateral foraminal narrowing at L4-5.  Reviewed imaging and patient presentation with Dr. Duque suggesting that the patient could proceed with a transforaminal epidural steroid injection at L4 or return to an Ortho/spine surgeon for reevaluation of current symptoms.  Due to significant lumbar symptoms which have progressed since his last visit,  recommend patient proceed with Ortho/spine evaluation.  Patient has already been seen by Ortho/spine surgery and would like to return to this office.  We will assist the patient to obtain an appointment. Advised patient to follow-up with our office after evaluation by Ortho/spine surgery.  Also recommended that if patient experiences an increase in neurologic symptoms associated with his low back pain to proceed with further evaluation in the emergency department.  As far as medication, recommend he continue his gabapentin 300 mg in the morning and 900 mg at bedtime.  Plan reviewed with patient at today's visit.      - Considering patient has worsening symptoms of lumbar radiculopathy and failed conservative treatment, we will send patient back to Ortho/spine surgery for reevaluation.     - Continue gabapentin to 300 mg in the morning and 900 mg at bedtime. The patient  was counseled on the risks and potential side effects of gabapentin as well as its importance for dosage titration. Side effects included but were not limited to, drowsiness, sedation, cognitive decline, peripheral edema, weight gain, seizures, with abrupt withdrawal.  Patient was instructed to call the office with any concerns or side effects before abruptly stopping medication.   -Encouraged patient to continue home therapy exercises and stretches that he is able to tolerate.  -Advised patient to go to the ED if he experiences an increase in neurologic symptoms associated with his back pain.  -Patient will follow-up in our office after evaluation by Ortho/spine surgery.           Relevant Medications    gabapentin (Neurontin) 300 mg capsule    Lumbar spondylosis M47.816    Relevant Medications    gabapentin (Neurontin) 300 mg capsule            [1]   Current Outpatient Medications:     acetaminophen (Tylenol) 500 mg tablet, Take by mouth every 6 hours if needed for mild pain (1 - 3)., Disp: , Rfl:     cyclobenzaprine (Flexeril) 5 mg tablet, Take 1 tablet (5 mg) by mouth 3 times a day as needed for muscle spasms. (Patient not taking: Reported on 5/8/2025), Disp: 30 tablet, Rfl: 0    gabapentin (Neurontin) 300 mg capsule, Patient will take gabapentin 300 mg in the morning and 900 mg at bedtime., Disp: 120 capsule, Rfl: 2    ibuprofen 400 mg tablet, Take 1 tablet (400 mg) by mouth every 6 hours if needed for moderate pain (4 - 6). Take it with food and never on an empty stomach. (Patient not taking: Reported on 5/8/2025), Disp: 20 tablet, Rfl: 0  [2]   Past Medical History:  Diagnosis Date    Personal history of other diseases of the digestive system     History of gastroesophageal reflux (GERD)    Skull fracture (Multi)    [3]   Past Surgical History:  Procedure Laterality Date    OTHER SURGICAL HISTORY  06/27/2019    Cystoscopy    OTHER SURGICAL HISTORY  06/27/2019    Urethral dilation   [4] No family history on  file.  [5] No Known Allergies

## 2025-05-08 NOTE — ASSESSMENT & PLAN NOTE
65-year-old male presents for follow-up of lower back pain radiating into bilateral lower extremities with claudication symptoms.   CT scan of his lumbar spine showing degenerative changes with some grade 1 anterolisthesis of L4 and L5 and some ligamentum flavum hypertrophy causing some central canal stenosis at that level as well as foraminal stenosis.  There was some question of the CT scan about a laminectomy on the right however patient has not had any type of back surgery.  Previous chiropractic treatment did not provide relief.  Interlaminar lumbar epidural steroid injection at L5-S1 provided limited relief for several days.  At that point patient was sent for lumbar MRI.  Presenting at today's office visit with persistent low back pain radiating to bilateral lower extremities.  Pain accompanied by significant numbness/tingling and weakness.  He is experiencing some evidence of urinary hesitancy which has not changed since his previous exam. Reviewed lumbar MRI consistent with right herniated disc at L3-4 with right-sided foraminal narrowing; severe bilateral foraminal narrowing at L4-5.  Reviewed imaging and patient presentation with Dr. Duque suggesting that the patient could proceed with a transforaminal epidural steroid injection at L4 or return to an Ortho/spine surgeon for reevaluation of current symptoms.  Due to significant lumbar symptoms which have progressed since his last visit,  recommend patient proceed with Ortho/spine evaluation.  Patient has already been seen by Ortho/spine surgery and would like to return to this office.  We will assist the patient to obtain an appointment. Advised patient to follow-up with our office after evaluation by Ortho/spine surgery.  Also recommended that if patient experiences an increase in neurologic symptoms associated with his low back pain to proceed with further evaluation in the emergency department.  As far as medication, recommend he continue his  gabapentin 300 mg in the morning and 900 mg at bedtime.  Plan reviewed with patient at today's visit.      - Considering patient has worsening symptoms of lumbar radiculopathy and failed conservative treatment, we will send patient back to Ortho/spine surgery for reevaluation.     - Continue gabapentin to 300 mg in the morning and 900 mg at bedtime. The patient was counseled on the risks and potential side effects of gabapentin as well as its importance for dosage titration. Side effects included but were not limited to, drowsiness, sedation, cognitive decline, peripheral edema, weight gain, seizures, with abrupt withdrawal.  Patient was instructed to call the office with any concerns or side effects before abruptly stopping medication.   -Encouraged patient to continue home therapy exercises and stretches that he is able to tolerate.  -Advised patient to go to the ED if he experiences an increase in neurologic symptoms associated with his back pain.  -Patient will follow-up in our office after evaluation by Ortho/spine surgery.

## 2025-05-13 ENCOUNTER — APPOINTMENT (OUTPATIENT)
Dept: PRIMARY CARE | Facility: CLINIC | Age: 65
End: 2025-05-13
Payer: MEDICARE

## 2025-05-27 ENCOUNTER — APPOINTMENT (OUTPATIENT)
Dept: ORTHOPEDIC SURGERY | Facility: CLINIC | Age: 65
End: 2025-05-27
Payer: MEDICARE

## 2025-05-27 ENCOUNTER — TELEPHONE (OUTPATIENT)
Dept: PAIN MEDICINE | Facility: HOSPITAL | Age: 65
End: 2025-05-27

## 2025-05-27 DIAGNOSIS — M48.062 SPINAL STENOSIS OF LUMBAR REGION WITH NEUROGENIC CLAUDICATION: Primary | ICD-10-CM

## 2025-05-27 PROCEDURE — 1159F MED LIST DOCD IN RCRD: CPT | Performed by: PHYSICIAN ASSISTANT

## 2025-05-27 PROCEDURE — 99214 OFFICE O/P EST MOD 30 MIN: CPT | Performed by: PHYSICIAN ASSISTANT

## 2025-05-27 PROCEDURE — 1160F RVW MEDS BY RX/DR IN RCRD: CPT | Performed by: PHYSICIAN ASSISTANT

## 2025-05-27 RX ORDER — TRAMADOL HYDROCHLORIDE 50 MG/1
50 TABLET, FILM COATED ORAL EVERY 8 HOURS PRN
Qty: 21 TABLET | Refills: 0 | Status: SHIPPED | OUTPATIENT
Start: 2025-05-27 | End: 2025-06-03

## 2025-05-27 NOTE — TELEPHONE ENCOUNTER
Pharmacy calling to advise just received a script for Take 1 tablet (50 mg) by mouth every 8 hours if needed for severe pain (7 - 10) for up to 7 days. From Ashley Brush PA-C

## 2025-05-27 NOTE — PROGRESS NOTES
Mateo returns to clinic today to review his MRI.    I last saw him at the end of January for low back pain, left greater than right.  His pain has continued to radiate down his legs, left worse than right.  He has since completed 2 months physical therapy.  He also had an injection with Dr. Duque.  This gave him a couple days of improvement.  He had an MRI done in April ordered by Dr. Duque's office.  Continues to have significant difficulty standing and walking.    He quit smoking in April after he was in the hospital for what he thought might be a heart attack.  He is not on any blood thinners.  He is not a diabetic.    On exam, balanced gait without assistive devices.  Slightly forward leaning.  Strength intact in lower extremities bilaterally.  Sensation intact without pathologic reflexes.    ROS: All other systems have been reviewed and are negative except as previously noted in history of present illness.      I personally reviewed an MRI of the lumbar spine from 4/19. There is an anterolisthesis of L4 on 5, there is severe central canal stenosis at this level.    I had a long discussion with him about his symptoms and treatment options moving forward.  We discussed continued conservative treatment with physical therapy and medication management.  We discussed the option for further injections.  At this time I recommended surgical intervention for his severe L4-5 stenosis.  He has already failed conservative treatment with physical therapy and 1 injection.  His symptoms are significantly impacting his quality of life.  He was made an appointment with Dr. Waters this week to further discuss surgical options.    **This note was dictated using speech recognition software and was not corrected for spelling or grammatical errors**

## 2025-05-28 NOTE — TELEPHONE ENCOUNTER
We are not prescribing opiates for this patient at this time.  We are only prescribing gabapentin for the patient.  Kayla Ventura RN

## 2025-05-29 ENCOUNTER — OFFICE VISIT (OUTPATIENT)
Dept: ORTHOPEDIC SURGERY | Facility: CLINIC | Age: 65
End: 2025-05-29
Payer: MEDICARE

## 2025-05-29 DIAGNOSIS — M48.062 NEUROGENIC CLAUDICATION DUE TO LUMBAR SPINAL STENOSIS: ICD-10-CM

## 2025-05-29 DIAGNOSIS — M54.16 LUMBAR RADICULOPATHY: Primary | ICD-10-CM

## 2025-05-29 DIAGNOSIS — R79.1 ABNORMAL COAGULATION PROFILE: ICD-10-CM

## 2025-05-29 PROCEDURE — 99215 OFFICE O/P EST HI 40 MIN: CPT | Performed by: ORTHOPAEDIC SURGERY

## 2025-05-29 PROCEDURE — 1159F MED LIST DOCD IN RCRD: CPT | Performed by: ORTHOPAEDIC SURGERY

## 2025-05-29 PROCEDURE — 1125F AMNT PAIN NOTED PAIN PRSNT: CPT | Performed by: ORTHOPAEDIC SURGERY

## 2025-05-29 ASSESSMENT — PAIN - FUNCTIONAL ASSESSMENT: PAIN_FUNCTIONAL_ASSESSMENT: 0-10

## 2025-05-29 ASSESSMENT — PAIN SCALES - GENERAL: PAINLEVEL_OUTOF10: 6

## 2025-05-29 NOTE — PROGRESS NOTES
Medical History[1]    Current Medications[2]     Social History     Socioeconomic History    Marital status:      Spouse name: Not on file    Number of children: Not on file    Years of education: Not on file    Highest education level: Not on file   Occupational History    Not on file   Tobacco Use    Smoking status: Every Day     Current packs/day: 0.25     Types: Cigarettes    Smokeless tobacco: Not on file   Substance and Sexual Activity    Alcohol use: Not Currently    Drug use: Yes     Types: Marijuana     Comment: yesterday    Sexual activity: Not on file   Other Topics Concern    Not on file   Social History Narrative    Not on file     Social Drivers of Health     Financial Resource Strain: Low Risk  (4/9/2025)    Overall Financial Resource Strain (CARDIA)     Difficulty of Paying Living Expenses: Not hard at all   Food Insecurity: No Food Insecurity (4/10/2025)    Hunger Vital Sign     Worried About Running Out of Food in the Last Year: Never true     Ran Out of Food in the Last Year: Never true   Transportation Needs: No Transportation Needs (4/9/2025)    PRAPARE - Transportation     Lack of Transportation (Medical): No     Lack of Transportation (Non-Medical): No   Physical Activity: Not on file   Stress: Not on file   Social Connections: Not on file   Intimate Partner Violence: Not At Risk (4/10/2025)    Humiliation, Afraid, Rape, and Kick questionnaire     Fear of Current or Ex-Partner: No     Emotionally Abused: No     Physically Abused: No     Sexually Abused: No   Housing Stability: Low Risk  (4/9/2025)    Housing Stability Vital Sign     Unable to Pay for Housing in the Last Year: No     Number of Times Moved in the Last Year: 0     Homeless in the Last Year: No       Walter Waters MD  Director, [facility]   Department of Orthopaedic Surgery  [facility]  [address] [place] [address]  [phone]       [1]   Past Medical History:  Diagnosis Date    Personal history of other diseases of the digestive system     History of gastroesophageal reflux (GERD)    Skull fracture (Multi)    [2]   Current Outpatient Medications:     acetaminophen (Tylenol) 500 mg tablet, Take by mouth every 6 hours if needed for mild pain (1 - 3)., Disp: , Rfl:     cyclobenzaprine (Flexeril) 5 mg tablet, Take 1 tablet (5 mg) by mouth 3 times a day as needed for muscle spasms. (Patient not taking: Reported on 5/8/2025), Disp: 30 tablet, Rfl: 0    gabapentin (Neurontin) 300 mg capsule, Patient will take gabapentin 300 mg in the morning and 900 mg at bedtime., Disp: 120 capsule, Rfl: 2    ibuprofen 400 mg tablet, Take 1 tablet (400 mg) by mouth every 6 hours if needed for moderate pain (4 - 6). Take it with food and never on an empty stomach. (Patient not taking: Reported on 5/8/2025), Disp: 20 tablet, Rfl: 0    traMADol (Ultram) 50 mg tablet, Take 1 tablet (50 mg) by mouth every 8 hours if needed for severe pain (7 - 10) for up to 7 days., Disp: 21 tablet, Rfl: 0     Unable to Pay for Housing in the Last Year: No     Number of Times Moved in the Last Year: 0     Homeless in the Last Year: No       Walter Waters MD  Director, [facility]   Department of Orthopaedic Surgery  [facility]  [address] [place] [address]  [phone]         [1]   Past Medical History:  Diagnosis Date    Personal history of other diseases of the digestive system     History of gastroesophageal reflux (GERD)    Skull fracture (Multi)    [2]   Current Outpatient Medications:     acetaminophen (Tylenol) 500 mg tablet, Take by mouth every 6 hours if needed for mild pain (1 - 3)., Disp: , Rfl:     cyclobenzaprine (Flexeril) 5 mg tablet, Take 1 tablet (5 mg) by mouth 3 times a day as needed for muscle spasms. (Patient not taking: Reported on 5/8/2025), Disp: 30 tablet, Rfl: 0    gabapentin (Neurontin) 300 mg capsule, Patient will take gabapentin 300 mg in the morning and 900 mg at bedtime., Disp: 120 capsule, Rfl: 2    ibuprofen 400 mg tablet, Take 1 tablet (400 mg) by mouth every 6 hours if needed for moderate pain (4 - 6). Take it with food and never on an empty stomach. (Patient not taking: Reported on 5/8/2025), Disp: 20 tablet, Rfl: 0    traMADol (Ultram) 50 mg tablet, Take 1 tablet (50 mg) by mouth every 8 hours if needed for severe pain (7 - 10) for up to 7 days., Disp: 21 tablet, Rfl: 0

## 2025-06-02 PROBLEM — M48.062 NEUROGENIC CLAUDICATION DUE TO LUMBAR SPINAL STENOSIS: Status: ACTIVE | Noted: 2025-05-29

## 2025-06-02 RX ORDER — ACETAMINOPHEN 325 MG/1
975 TABLET ORAL ONCE
OUTPATIENT
Start: 2025-06-02 | End: 2025-06-02

## 2025-06-02 RX ORDER — GABAPENTIN 300 MG/1
600 CAPSULE ORAL ONCE
OUTPATIENT
Start: 2025-06-02 | End: 2025-06-02

## 2025-06-02 RX ORDER — CEFAZOLIN SODIUM 2 G/100ML
2 INJECTION, SOLUTION INTRAVENOUS ONCE
OUTPATIENT
Start: 2025-06-02 | End: 2025-06-02

## 2025-07-03 ENCOUNTER — TELEPHONE (OUTPATIENT)
Dept: ORTHOPEDIC SURGERY | Facility: CLINIC | Age: 65
End: 2025-07-03

## 2025-07-03 ENCOUNTER — PRE-ADMISSION TESTING (OUTPATIENT)
Dept: PREADMISSION TESTING | Facility: HOSPITAL | Age: 65
End: 2025-07-03
Payer: MEDICARE

## 2025-07-03 VITALS
OXYGEN SATURATION: 96 % | BODY MASS INDEX: 22.73 KG/M2 | DIASTOLIC BLOOD PRESSURE: 84 MMHG | RESPIRATION RATE: 18 BRPM | HEIGHT: 67 IN | WEIGHT: 144.84 LBS | TEMPERATURE: 97.2 F | SYSTOLIC BLOOD PRESSURE: 144 MMHG | HEART RATE: 71 BPM

## 2025-07-03 DIAGNOSIS — Z01.818 PREOPERATIVE EXAMINATION: Primary | ICD-10-CM

## 2025-07-03 DIAGNOSIS — M54.16 LUMBAR RADICULOPATHY: ICD-10-CM

## 2025-07-03 DIAGNOSIS — M47.816 LUMBAR SPONDYLOSIS: ICD-10-CM

## 2025-07-03 DIAGNOSIS — M48.062 SPINAL STENOSIS OF LUMBAR REGION WITH NEUROGENIC CLAUDICATION: ICD-10-CM

## 2025-07-03 DIAGNOSIS — M48.062 NEUROGENIC CLAUDICATION DUE TO LUMBAR SPINAL STENOSIS: ICD-10-CM

## 2025-07-03 LAB
ABO GROUP (TYPE) IN BLOOD: NORMAL
ANION GAP SERPL CALC-SCNC: 13 MMOL/L (ref 10–20)
ANTIBODY SCREEN: NORMAL
APTT PPP: 31 SECONDS (ref 26–36)
BASOPHILS # BLD AUTO: 0.01 X10*3/UL (ref 0–0.1)
BASOPHILS NFR BLD AUTO: 0.1 %
BUN SERPL-MCNC: 12 MG/DL (ref 6–23)
CALCIUM SERPL-MCNC: 9.7 MG/DL (ref 8.6–10.3)
CHLORIDE SERPL-SCNC: 107 MMOL/L (ref 98–107)
CO2 SERPL-SCNC: 24 MMOL/L (ref 21–32)
CREAT SERPL-MCNC: 0.74 MG/DL (ref 0.5–1.3)
EGFRCR SERPLBLD CKD-EPI 2021: >90 ML/MIN/1.73M*2
EOSINOPHIL # BLD AUTO: 0 X10*3/UL (ref 0–0.7)
EOSINOPHIL NFR BLD AUTO: 0 %
ERYTHROCYTE [DISTWIDTH] IN BLOOD BY AUTOMATED COUNT: 13.7 % (ref 11.5–14.5)
GLUCOSE SERPL-MCNC: 92 MG/DL (ref 74–99)
HCT VFR BLD AUTO: 42.2 % (ref 41–52)
HGB BLD-MCNC: 14 G/DL (ref 13.5–17.5)
IMM GRANULOCYTES # BLD AUTO: 0.03 X10*3/UL (ref 0–0.7)
IMM GRANULOCYTES NFR BLD AUTO: 0.4 % (ref 0–0.9)
INR PPP: 0.9 (ref 0.9–1.1)
LYMPHOCYTES # BLD AUTO: 1.83 X10*3/UL (ref 1.2–4.8)
LYMPHOCYTES NFR BLD AUTO: 25.9 %
MCH RBC QN AUTO: 30.1 PG (ref 26–34)
MCHC RBC AUTO-ENTMCNC: 33.2 G/DL (ref 32–36)
MCV RBC AUTO: 91 FL (ref 80–100)
MONOCYTES # BLD AUTO: 0.52 X10*3/UL (ref 0.1–1)
MONOCYTES NFR BLD AUTO: 7.4 %
NEUTROPHILS # BLD AUTO: 4.68 X10*3/UL (ref 1.2–7.7)
NEUTROPHILS NFR BLD AUTO: 66.2 %
NRBC BLD-RTO: 0 /100 WBCS (ref 0–0)
PLATELET # BLD AUTO: 179 X10*3/UL (ref 150–450)
POTASSIUM SERPL-SCNC: 4.6 MMOL/L (ref 3.5–5.3)
PROTHROMBIN TIME: 10.4 SECONDS (ref 9.8–12.4)
RBC # BLD AUTO: 4.65 X10*6/UL (ref 4.5–5.9)
RH FACTOR (ANTIGEN D): NORMAL
SODIUM SERPL-SCNC: 139 MMOL/L (ref 136–145)
WBC # BLD AUTO: 7.1 X10*3/UL (ref 4.4–11.3)

## 2025-07-03 PROCEDURE — 99204 OFFICE O/P NEW MOD 45 MIN: CPT | Performed by: NURSE PRACTITIONER

## 2025-07-03 PROCEDURE — 85730 THROMBOPLASTIN TIME PARTIAL: CPT | Performed by: ORTHOPAEDIC SURGERY

## 2025-07-03 PROCEDURE — 86901 BLOOD TYPING SEROLOGIC RH(D): CPT | Performed by: ORTHOPAEDIC SURGERY

## 2025-07-03 PROCEDURE — 85610 PROTHROMBIN TIME: CPT | Performed by: ORTHOPAEDIC SURGERY

## 2025-07-03 PROCEDURE — 85025 COMPLETE CBC W/AUTO DIFF WBC: CPT | Performed by: ORTHOPAEDIC SURGERY

## 2025-07-03 PROCEDURE — 87081 CULTURE SCREEN ONLY: CPT | Mod: GEALAB

## 2025-07-03 PROCEDURE — 82374 ASSAY BLOOD CARBON DIOXIDE: CPT | Performed by: ORTHOPAEDIC SURGERY

## 2025-07-03 RX ORDER — TRAMADOL HYDROCHLORIDE 50 MG/1
50 TABLET, FILM COATED ORAL
COMMUNITY
End: 2025-07-03 | Stop reason: SDUPTHER

## 2025-07-03 RX ORDER — TRAMADOL HYDROCHLORIDE 50 MG/1
50 TABLET, FILM COATED ORAL EVERY 8 HOURS PRN
Qty: 42 TABLET | Refills: 0 | Status: SHIPPED | OUTPATIENT
Start: 2025-07-03

## 2025-07-03 RX ORDER — CHLORHEXIDINE GLUCONATE ORAL RINSE 1.2 MG/ML
15 SOLUTION DENTAL DAILY
Qty: 30 ML | Refills: 0 | Status: SHIPPED | OUTPATIENT
Start: 2025-07-03 | End: 2025-07-05

## 2025-07-03 RX ORDER — GABAPENTIN 300 MG/1
CAPSULE ORAL
Qty: 120 CAPSULE | Refills: 2 | Status: SHIPPED | OUTPATIENT
Start: 2025-07-03

## 2025-07-03 RX ORDER — CHLORHEXIDINE GLUCONATE 40 MG/ML
1 SOLUTION TOPICAL DAILY
Start: 2025-07-03 | End: 2025-07-08

## 2025-07-03 ASSESSMENT — PAIN - FUNCTIONAL ASSESSMENT: PAIN_FUNCTIONAL_ASSESSMENT: 0-10

## 2025-07-03 ASSESSMENT — DUKE ACTIVITY SCORE INDEX (DASI)
CAN YOU WALK A BLOCK OR TWO ON LEVEL GROUND: YES
CAN YOU TAKE CARE OF YOURSELF (EAT, DRESS, BATHE, OR USE TOILET): YES
CAN YOU HAVE SEXUAL RELATIONS: NO
CAN YOU PARTICIPATE IN STRENOUS SPORTS LIKE SWIMMING, SINGLES TENNIS, FOOTBALL, BASKETBALL, OR SKIING: NO
CAN YOU DO YARD WORK LIKE RAKING LEAVES, WEEDING OR PUSHING A MOWER: NO
CAN YOU DO HEAVY WORK AROUND THE HOUSE LIKE SCRUBBING FLOORS OR LIFTING AND MOVING HEAVY FURNITURE: NO
TOTAL_SCORE: 18.95
CAN YOU PARTICIPATE IN MODERATE RECREATIONAL ACTIVITIES LIKE GOLF, BOWLING, DANCING, DOUBLES TENNIS OR THROWING A BASEBALL OR FOOTBALL: NO
CAN YOU DO MODERATE WORK AROUND THE HOUSE LIKE VACUUMING, SWEEPING FLOORS OR CARRYING GROCERIES: YES
DASI METS SCORE: 5.1
CAN YOU RUN A SHORT DISTANCE: NO
CAN YOU DO LIGHT WORK AROUND THE HOUSE LIKE DUSTING OR WASHING DISHES: YES
CAN YOU CLIMB A FLIGHT OF STAIRS OR WALK UP A HILL: YES
CAN YOU WALK INDOORS, SUCH AS AROUND YOUR HOUSE: YES

## 2025-07-03 ASSESSMENT — ENCOUNTER SYMPTOMS
GASTROINTESTINAL NEGATIVE: 1
CONSTITUTIONAL NEGATIVE: 1
NEUROLOGICAL NEGATIVE: 1
MUSCULOSKELETAL NEGATIVE: 1
CARDIOVASCULAR NEGATIVE: 1
EYES NEGATIVE: 1
NECK NEGATIVE: 1
RESPIRATORY NEGATIVE: 1

## 2025-07-03 ASSESSMENT — PAIN SCALES - GENERAL: PAINLEVEL_OUTOF10: 7

## 2025-07-03 ASSESSMENT — LIFESTYLE VARIABLES: SMOKING_STATUS: NONSMOKER

## 2025-07-03 NOTE — CPM/PAT H&P
CPM/PAT Evaluation       Name: Mateo Smith (Mateo Smith)  /Age: 1960/65 y.o.     Visit Type:   In-Person       Chief Complaint: Lumbar radiculopathy    HPI 64 y/o male scheduled for L4-5 minimally invasive decompression and fusion on 2025 with  Dr. Waters secondary to lumbar radiculopathy.  PMHX includes neurogenic claudication.  PAT is consulted today for perioperative risk stratification and optimization.      Medical History[1]    Surgical History[2]    Patient  has no history on file for sexual activity.    Family History[3]    Allergies[4]    Prior to Admission medications    Medication Sig Start Date End Date Taking? Authorizing Provider   acetaminophen (Tylenol) 500 mg tablet Take by mouth every 6 hours if needed for mild pain (1 - 3).   Yes Historical Provider, MD   gabapentin (Neurontin) 300 mg capsule Patient will take gabapentin 300 mg in the morning and 900 mg at bedtime. 25  Yes LUÍS Xiao-CNP, APRN-CNS   ibuprofen 400 mg tablet Take 1 tablet (400 mg) by mouth every 6 hours if needed for moderate pain (4 - 6). Take it with food and never on an empty stomach. 4/10/25  Yes Zaki Cornejo MD   traMADol (Ultram) 50 mg tablet Take 1 tablet (50 mg) by mouth.   Yes Historical Provider, MD   chlorhexidine (Hibiclens) 4 % external liquid Apply 1 Application topically once daily for 5 days. Use per CPM/PAT provided instructions 7/3/25 7/8/25  NICKOLAS Ambrosio   chlorhexidine (Peridex) 0.12 % solution Use 15 mL in the mouth or throat once daily for 2 doses. 7/3/25 7/5/25  NICKOLAS Ambrosio   cyclobenzaprine (Flexeril) 5 mg tablet Take 1 tablet (5 mg) by mouth 3 times a day as needed for muscle spasms.  Patient not taking: Reported on 7/3/2025 4/10/25   Zaki Cornejo MD        [facility] ROS:   Constitutional:   neg    Neuro/Psych:   neg    Eyes:   neg    Ears:   neg    Nose:   Mouth:   Throat:   Neck:   neg    Cardio:   neg    Respiratory:   neg    Endocrine:   GI:   neg   "  :   neg    Musculoskeletal:   neg    Hematologic:   Skin:      Physical Exam  Vitals reviewed.   Constitutional:       Appearance: Normal appearance.   HENT:      Mouth/Throat:      Mouth: Mucous membranes are moist.   Eyes:      Extraocular Movements: Extraocular movements intact.   Cardiovascular:      Rate and Rhythm: Normal rate and regular rhythm.      Pulses: Normal pulses.      Heart sounds: Normal heart sounds.   Pulmonary:      Effort: Pulmonary effort is normal.      Breath sounds: Normal breath sounds.   Abdominal:      General: Bowel sounds are normal.      Palpations: Abdomen is soft.   Musculoskeletal:         General: Normal range of motion.      Cervical back: Normal range of motion and neck supple.   Skin:     General: Skin is warm and dry.   Neurological:      General: No focal deficit present.      Mental Status: He is alert and oriented to person, place, and time.   Psychiatric:         Mood and Affect: Mood normal.         Behavior: Behavior normal.          Airway    Testing/Diagnostic:     Patient Specialist/PCP:     Visit Vitals  /84   Pulse 71   Temp 36.2 °C (97.2 °F)   Resp 18   Ht 1.702 m (5' 7\")   Wt 65.7 kg (144 lb 13.5 oz)   SpO2 96%   BMI 22.69 kg/m²   Smoking Status Former   BSA 1.76 m²       DASI Risk Score      Flowsheet Row Pre-Admission Testing from 7/3/2025 in [facility]   Can you take care of yourself (eat, dress, bathe, or use toilet)?  2.75 filed at 07/03/2025 1018   Can you walk indoors, such as around your house? 1.75 filed at 07/03/2025 1018   Can you walk a block or two on level ground?  2.75 filed at 07/03/2025 1018   Can you climb a flight of stairs or walk up a hill? 5.5 filed at 07/03/2025 1018   Can you run a short distance? 0 filed at 07/03/2025 1018   Can you do light work around the house like dusting or washing dishes? 2.7 filed at 07/03/2025 1018   Can you do moderate work around the house like vacuuming, sweeping floors or carrying " groceries? 3.5 filed at 07/03/2025 1018   Can you do heavy work around the house like scrubbing floors or lifting and moving heavy furniture?  0 filed at 07/03/2025 1018   Can you do yard work like raking leaves, weeding or pushing a mower? 0 filed at 07/03/2025 1018   Can you have sexual relations? 0 filed at 07/03/2025 1018   Can you participate in moderate recreational activities like golf, bowling, dancing, doubles tennis or throwing a baseball or football? 0 filed at 07/03/2025 1018   Can you participate in strenous sports like swimming, singles tennis, football, basketball, or skiing? 0 filed at 07/03/2025 1018   DASI SCORE 18.95 filed at 07/03/2025 1018   METS Score (Will be calculated only when all the questions are answered) 5.1 filed at 07/03/2025 1018          Caprini DVT Assessment      Flowsheet Row Pre-Admission Testing from 7/3/2025 in [facility] ED to Hosp-Admission (Discharged) from 4/9/2025 in [facility] with Zaki Cornejo MD and Raleigh Edwards, DO   DVT Score (IF A SCORE IS NOT CALCULATING, MUST SELECT A BMI TO COMPLETE) 5 filed at 07/03/2025 1038 3 filed at 04/09/2025 1517   Surgical Factors Major surgery planned, including arthroscopic and laproscopic (1-2 hours) filed at 07/03/2025 1038 --   BMI (BMI MUST BE CHOSEN) 30 or less filed at 07/03/2025 1038 30 or less filed at 04/09/2025 1517          Modified Frailty Index    No data to display       [id number] Stroke Risk Points  Current as of just now        N/A 0 to 9 Points:      Last Change: N/A          The [id number] risk score (Lip COURTNEY, et al. 2009. © 2010 [employer]) quantifies the risk of stroke for a patient with atrial fibrillation. For patients without atrial fibrillation or under the age of 18 this score appears as N/A. Higher score values generally indicate higher risk of stroke.        This score is not applicable to this patient. Components are not  calculated.          Revised Cardiac Risk Index      Flowsheet Row Pre-Admission Testing from 7/3/2025 in [facility]   High-Risk Surgery (Intraperitoneal, Intrathoracic,Suprainguinal vascular) 0 filed at 07/03/2025 1100   History of ischemic heart disease (History of MI, History of positive exercuse test, Current chest paint considered due to myocardial ischemia, Use of nitrate therapy, ECG with pathological Q Waves) 0 filed at 07/03/2025 1100   History of congestive heart failure (pulmonary edemia, bilateral rales or S3 gallop, Paroxysmal nocturnal dyspnea, CXR showing pulmonary vascular redistribution) 0 filed at 07/03/2025 1100   History of cerebrovascular disease (Prior TIA or stroke) 0 filed at 07/03/2025 1100   Pre-operative insulin treatment 0 filed at 07/03/2025 1100   Pre-operative creatinine>2 mg/dl 0 filed at 07/03/2025 1100   Revised Cardiac Risk Calculator 0 filed at 07/03/2025 1100          Apfel Simplified Score      Flowsheet Row Pre-Admission Testing from 7/3/2025 in [facility]   Smoking status 1 filed at 07/03/2025 1101   History of motion sickness or PONV  0 filed at 07/03/2025 1101   Use of postoperative opioids 1 filed at 07/03/2025 1101   Gender - Female 0=No filed at 07/03/2025 1101   Apfel Simplified Score Calculator 2 filed at 07/03/2025 1101          Risk Analysis Index Results This Encounter    No data found in the last 10 encounters.       Stop Bang Score      Flowsheet Row Pre-Admission Testing from 7/3/2025 in [facility]   Do you snore loudly? 0 filed at 07/03/2025 1017   Do you often feel tired or fatigued after your sleep? 0 filed at 07/03/2025 1017   Has anyone ever observed you stop breathing in your sleep? 0 filed at 07/03/2025 1017   Do you have or are you being treated for high blood pressure? 0 filed at 07/03/2025 1017   Recent BMI (Calculated) 23.2 filed at 07/03/2025 1017   Is BMI greater than 35 kg/m2? 0=No filed at 07/03/2025 1017    Age older than 50 years old? 1=Yes filed at 07/03/2025 1017   Is your neck circumference greater than 17 inches (Male) or 16 inches (Female)? 0 filed at 07/03/2025 1017   Gender - Male 1=Yes filed at 07/03/2025 1017   STOP-BANG Total Score 2 filed at 07/03/2025 1017          Prodigy: High Risk  Total Score: 19              Prodigy Age Score      Prodigy Gender Score     Prodigy Previous Opioid Use Score           ARISCAT Score for Postoperative Pulmonary Complications      Flowsheet Row Pre-Admission Testing from 7/3/2025 in [facility]   Age Calculated Score 3 filed at 07/03/2025 1101   Preoperative SpO2 0 filed at 07/03/2025 1101   Respiratory infection in the last month Either upper or lower (i.e., URI, bronchitis, pneumonia), with fever and antibiotic treatment 0 filed at 07/03/2025 1101   Preoperative anemia (Hgb less than 10 g/dl) 0 filed at 07/03/2025 1101   Surgical incision  0 filed at 07/03/2025 1101   Duration of surgery  0 filed at 07/03/2025 1101   Emergency Procedure  0 filed at 07/03/2025 1101   ARISCAT Total Score  3 filed at 07/03/2025 1101          Azul Perioperative Risk for Myocardial Infarction or Cardiac Arrest (JUS)      Flowsheet Row Pre-Admission Testing from 7/3/2025 in [facility]   Calculated Age Score 1.32 filed at 07/03/2025 1101   Functional Status  0 filed at 07/03/2025 1101   ASA Class  -3.29 filed at 07/03/2025 1101   Creatinine 0 filed at 07/03/2025 1101   Type of Procedure  0.21 filed at 07/03/2025 1101   JUS Total Score  -7.01 filed at 07/03/2025 1101   JUS % 0.09 filed at 07/03/2025 1101                Assessment and Plan:       HPI 64 y/o male scheduled for L4-5 minimally invasive decompression and fusion on 7/18/2025 with  Dr. Waters secondary to lumbar radiculopathy.  PMHX includes neurogenic claudication.  PAT is consulted today for perioperative risk stratification and optimization.    Neuro:  No neurologic diagnosis or significant findings  on chart review, clinical presentation and evaluation.  No grossly apparent neurologic perioperative risk.    Patient is not at increased risk for perioperative CVA      HEENT:  No HEENT diagnosis or significant findings on chart review or clinical presentation and evaluation. No further preoperative testing/intervention indicated at this time.    Cardiovascular:  No CV diagnosis or significant findings on chart review or clinical presentation and evaluation. No further preoperative testing or intervention is indicated at this time.  METS: 5.1  RCRI: 0 points, 3.9%  risk for postoperative MACE       Pulmonary:  No pulmonary diagnosis, however patient is at increased risk of perioperative complications secondary to  age > 60  Stop Bang score is 2 placing patient at low risk for CHRISTIAN  PRODIGY: Moderate risk for opioid induced respiratory depression  Pumonary education discussed, patient also provided deep breathing exerciseswith educational handout    Renal:   No renal diagnosis, however patient is at increase risk for perioperative renal complications secondary to  Age equal to or greater than 56, use of an ace, arb, or NSAID      Endocrine:  No endocrine diagnosis or significant findings on chart review or clinical presentation and evaluation. No further testing or intervention is indicated at this time.    Hematologic:  No hematologic diagnosis, however patient is at an increased risk for DVT  Caprini Score 5, patient at High risk for perioperative DVT.  Patient provided with VTE education/handout.    Gastrointestinal:   No GI diagnosis or significant findings on chart review or clinical presentation and evaluation.   Apfel 2    Orthopedic surgery  Seen by Dr. Waters on 5/29/2025 for lumbar radiculopathy  Plan for L4-5 minimally invasive decompression     Infectious disease:   No infectious diagnosis or significant findings on chart review or clinical presentation and evaluation.   Prescription provided for CHG body  wash and dental rinse. CHG use instructions reviewed and provided to patient.  Staph screen collected    Musculoskeletal:   No diagnosis or significant findings on chart review or clinical presentation and evaluation.     Anesthesia/Airway:  No anesthesia complications      Medication instructions and NPO guidelines reviewed with the patient.  All questions or concerns discussed and addressed.      Labs and EKG ordered                  [1]   Past Medical History:  Diagnosis Date    Personal history of other diseases of the digestive system     History of gastroesophageal reflux (GERD)    Skull fracture (Multi) 2007   [2]   Past Surgical History:  Procedure Laterality Date    OTHER SURGICAL HISTORY  06/27/2019    Cystoscopy    OTHER SURGICAL HISTORY  06/27/2019    Urethral dilation   [3] No family history on file.  [4] No Known Allergies

## 2025-07-03 NOTE — PREPROCEDURE INSTRUCTIONS
Thank you for visiting Preadmission Testing (PAT) today for your pre-procedure evaluation, you were seen by     Hannah Lanza CNP  Pre Admission Testing  [facility]  [phone]    This summary includes instructions and information to aid you during your perioperative period.  Please read carefully. If you have any questions about your visit today, please call the number listed above.  If you become ill or have any changes to your health before your surgery, please contact your primary care provider and alert your surgeon.      Preparing for your Surgery       Exercises  Preoperative Deep Breathing Exercises  Why it is important to do deep breathing exercises before my surgery?  Deep breathing exercises strengthen your breathing muscles.  This helps you to recover after your surgery and decreases the chance of breathing complications.  How are the deep breathing exercises done?  Sit straight with your back supported.  Breathe in deeply and slowly through your nose. Your lower rib cage should expand and your abdomen may move forward.  Hold that breath for 3 to 5 seconds.  Breathe out through pursed lips, slowly and completely.  Rest and repeat 10 times every hour while awake.  Rest longer if you become dizzy or lightheaded.       Preoperative Brain Exercises    What are brain exercises?  A brain exercise is any activity that engages your thinking (cognitive) skills.    What types of activities are considered brain exercises?  Jigsaw puzzles, crossword puzzles, word jumble, memory games, word search, and many more.  Many can be found free online or on your phone via a mobile rubio.    Why should I do brain exercises before my surgery?  More recent research has shown brain exercise before surgery can lower the risk of postoperative delirium (confusion) which can be especially important for older adults.  Patients who did brain exercises for 5 to 10 hours the days before surgery, cut their risk  of postoperative delirium in half up to 1 week after surgery.    Sit-to-Stand Exercise    What is the sit-to-stand exercise?  The sit-to-stand exercise strengthens the muscles of your lower body and muscles in the center of your body (core muscles for stability) helping to maintain and improve your strength and mobility.  How do I do the sit-to-stand exercise?  The goal is to do this exercise without using your arms or hands.  If this is too difficult, use your arms and hands or a chair with armrests to help slowly push yourself to the standing position and lower yourself back to the sitting position. As the movement becomes easier use your arms and hands less.    Steps to the sit-to-stand exercise  Sit up tall in a sturdy chair, knees bent, feet flat on the floor shoulder-width apart.  Shift your hips/pelvis forward in the chair to correctly position yourself for the next movement.  Lean forward at your hips.  Stand up straight putting equal weight on both feet.  Check to be sure you are properly aligned with the chair, in a slow controlled movement sit back down.  Repeat this exercise 10-15 times.  If needed you can do it fewer times until your strength improves.  Rest for 1 minute.  Do another 10-15 sit-to-stand exercises.  Try to do this in the morning and evening.        Instructions    Preoperative Fasting Guidelines    Why must I stop eating and drinking near surgery time?  With sedation, food or liquid in your stomach can enter your lungs causing serious complications  Food can increase nausea and vomiting  When do I need to stop eating and drinking before my surgery?      Do not eat any food after midnight the night before your surgery/procedure. You may have up to 13.5 ounces of clear liquid until TWO hours before your instructed arrival time to the hospital.  This includes water, black tea/coffee, (no milk or cream) apple juice, and electrolyte drinks (Gatorade). You may chew gum until TWO hours before  your surgery/procedure            Simple things you can do to help prevent blood clots     Blood clots are blockages that can form in the body's veins. When a blood clot forms in your deep veins, it may be called a deep vein thrombosis, or DVT for short. Blood clots can happen in any part of the body where blood flows, but they are most common in the arms and legs. If a piece of a blood clot breaks free and travels to the lungs, it is called a pulmonary embolus (PE). A PE can be a very serious problem.         Being in the hospital or having surgery can raise your chances of getting a blood clot because you may not be well enough to move around as much as you normally do.         Ways you can help prevent blood clots in the hospital       Wearing SCDs  SCDs stands for Sequential Compression Devices.   SCDs are special sleeves that wrap around your legs. They attach to a pump that fills them with air to gently squeeze your legs every few minutes.  This helps return the blood in your legs to your heart.   SCDs should only be taken off when walking or bathing. SCDs may not be comfortable, but they can help save your life.              Pump SCD leg sleeves  Wearing compression stockings - if your doctor orders them. These special snug-fitting stockings gently squeeze your legs to help blood flow.       Walking. Walking helps move the blood in your legs.   If your doctor says it is ok, try walking the halls at least   5 times a day. Ask us to help you get up, so you don't fall.      Taking any blood-thinning medicines your doctor orders.              Ways you can help prevent blood clots at home         Wearing compression stockings - if your doctor orders them.   Walking - to help move the blood in your legs.    Taking any blood-thinning medicines your doctor orders.      Signs of a blood clot or PE    Tell your doctor or nurse right away if you have any of the problems listed below.         If you are at home, seek  medical care right away. Call 911 for chest pain or problems breathing.            Signs of a blood clot (DVT) - such as pain, swelling, redness, or warmth in your arm or legs.  Signs of a pulmonary embolism (PE) - such as chest pain or feeling short of breath      Tobacco and Alcohol;  Do not drink alcohol or smoke within 24 hours of surgery.  It is best to quit smoking for as long as possible before any surgery or procedure.      The Week before Surgery        Seven days before Surgery  Check your PAT medication instructions  Do the exercises provided to you by PAT  Arrange for a responsible, adult licensed  to take you home after surgery and stay with you for 24 hours.  You will not be permitted to drive yourself home if you have received any anesthetic/sedation  Six days before surgery  Check your PAT medication instructions  Do the exercises provided to you by PAT  Start using Chlorhexidene (CHG) body wash if prescribed  Five days before surgery  Check your PAT medication instructions  Do the exercises provided to you by PAT  Continue to use CHG body wash if prescribed  Three days before surgery  Check your PAT medication instructions  Do the exercises provided to you by PAT  Continue to use CHG body wash if prescribed  Two days before surgery  Check your PAT medication instructions  Do the exercises provided to you by PAT  Continue to use CHG body wash if prescribed    The Day before Surgery       Check your PAT medication and all other PAT instructions including when to stop eating and drinking  You will be called with your arrival time for surgery in the late afternoon.  If you do not receive a call please reach out to [facility]. [phone]  Do not smoke or drink 24 hours before surgery  Prepare items to bring with you to the hospital  Shower with your chlorhexidine wash if prescribed  Brush your teeth and use your chlorhexidine dental rinse if prescribed    The Day of Surgery       Check your  PAT medication instructions  Ensure you follow the instructions for when to stop eating and drinking  Shower, if prescribed use CHG.  Do not apply any lotions, creams, moisturizers, perfume or deodorant  Brush your teeth and use your CHG dental rinse if prescribed  Wear loose comfortable clothing  Avoid make-up  Remove  jewelry and piercings, consider professional piercing removal with a plastic spacer if needed  Bring photo ID and Insurance card  Bring an accurate medication list that includes medication dose, frequency and allergies  Bring a copy of your advanced directives (will, health care power of )  Bring any devices and controllers as well as medical devices you have been provided with for surgery (CPAP, slings, braces, etc.)  Dentures, eyeglasses, and contacts will be removed before surgery, please bring cases for contacts or glasses

## 2025-07-03 NOTE — H&P (VIEW-ONLY)
CPM/PAT Evaluation       Name: Mateo Smith (Mateo Smith)  /Age: 1960/65 y.o.     Visit Type:   In-Person       Chief Complaint: Lumbar radiculopathy    HPI 64 y/o male scheduled for L4-5 minimally invasive decompression and fusion on 2025 with  Dr. Waters secondary to lumbar radiculopathy.  PMHX includes neurogenic claudication.  PAT is consulted today for perioperative risk stratification and optimization.      Medical History[1]    Surgical History[2]    Patient  has no history on file for sexual activity.    Family History[3]    Allergies[4]    Prior to Admission medications    Medication Sig Start Date End Date Taking? Authorizing Provider   acetaminophen (Tylenol) 500 mg tablet Take by mouth every 6 hours if needed for mild pain (1 - 3).   Yes Historical Provider, MD   gabapentin (Neurontin) 300 mg capsule Patient will take gabapentin 300 mg in the morning and 900 mg at bedtime. 25  Yes LUÍS Xiao-CNP, APRN-CNS   ibuprofen 400 mg tablet Take 1 tablet (400 mg) by mouth every 6 hours if needed for moderate pain (4 - 6). Take it with food and never on an empty stomach. 4/10/25  Yes Zaki Cornejo MD   traMADol (Ultram) 50 mg tablet Take 1 tablet (50 mg) by mouth.   Yes Historical Provider, MD   chlorhexidine (Hibiclens) 4 % external liquid Apply 1 Application topically once daily for 5 days. Use per CPM/PAT provided instructions 7/3/25 7/8/25  NICKOLAS Ambrosio   chlorhexidine (Peridex) 0.12 % solution Use 15 mL in the mouth or throat once daily for 2 doses. 7/3/25 7/5/25  NICKOLAS Ambrosio   cyclobenzaprine (Flexeril) 5 mg tablet Take 1 tablet (5 mg) by mouth 3 times a day as needed for muscle spasms.  Patient not taking: Reported on 7/3/2025 4/10/25   Zaki Cornejo MD        [facility] ROS:   Constitutional:   neg    Neuro/Psych:   neg    Eyes:   neg    Ears:   neg    Nose:   Mouth:   Throat:   Neck:   neg    Cardio:   neg    Respiratory:   neg    Endocrine:   GI:   neg   "  :   neg    Musculoskeletal:   neg    Hematologic:   Skin:      Physical Exam  Vitals reviewed.   Constitutional:       Appearance: Normal appearance.   HENT:      Mouth/Throat:      Mouth: Mucous membranes are moist.   Eyes:      Extraocular Movements: Extraocular movements intact.   Cardiovascular:      Rate and Rhythm: Normal rate and regular rhythm.      Pulses: Normal pulses.      Heart sounds: Normal heart sounds.   Pulmonary:      Effort: Pulmonary effort is normal.      Breath sounds: Normal breath sounds.   Abdominal:      General: Bowel sounds are normal.      Palpations: Abdomen is soft.   Musculoskeletal:         General: Normal range of motion.      Cervical back: Normal range of motion and neck supple.   Skin:     General: Skin is warm and dry.   Neurological:      General: No focal deficit present.      Mental Status: He is alert and oriented to person, place, and time.   Psychiatric:         Mood and Affect: Mood normal.         Behavior: Behavior normal.          Airway    Testing/Diagnostic:     Patient Specialist/PCP:     Visit Vitals  /84   Pulse 71   Temp 36.2 °C (97.2 °F)   Resp 18   Ht 1.702 m (5' 7\")   Wt 65.7 kg (144 lb 13.5 oz)   SpO2 96%   BMI 22.69 kg/m²   Smoking Status Former   BSA 1.76 m²       DASI Risk Score      Flowsheet Row Pre-Admission Testing from 7/3/2025 in [facility]   Can you take care of yourself (eat, dress, bathe, or use toilet)?  2.75 filed at 07/03/2025 1018   Can you walk indoors, such as around your house? 1.75 filed at 07/03/2025 1018   Can you walk a block or two on level ground?  2.75 filed at 07/03/2025 1018   Can you climb a flight of stairs or walk up a hill? 5.5 filed at 07/03/2025 1018   Can you run a short distance? 0 filed at 07/03/2025 1018   Can you do light work around the house like dusting or washing dishes? 2.7 filed at 07/03/2025 1018   Can you do moderate work around the house like vacuuming, sweeping floors or carrying " groceries? 3.5 filed at 07/03/2025 1018   Can you do heavy work around the house like scrubbing floors or lifting and moving heavy furniture?  0 filed at 07/03/2025 1018   Can you do yard work like raking leaves, weeding or pushing a mower? 0 filed at 07/03/2025 1018   Can you have sexual relations? 0 filed at 07/03/2025 1018   Can you participate in moderate recreational activities like golf, bowling, dancing, doubles tennis or throwing a baseball or football? 0 filed at 07/03/2025 1018   Can you participate in strenous sports like swimming, singles tennis, football, basketball, or skiing? 0 filed at 07/03/2025 1018   DASI SCORE 18.95 filed at 07/03/2025 1018   METS Score (Will be calculated only when all the questions are answered) 5.1 filed at 07/03/2025 1018          Caprini DVT Assessment      Flowsheet Row Pre-Admission Testing from 7/3/2025 in [facility] ED to Hosp-Admission (Discharged) from 4/9/2025 in [facility] Observation with Zaki Cornejo MD and Raleigh Edwards, DO   DVT Score (IF A SCORE IS NOT CALCULATING, MUST SELECT A BMI TO COMPLETE) 5 filed at 07/03/2025 1038 3 filed at 04/09/2025 1517   Surgical Factors Major surgery planned, including arthroscopic and laproscopic (1-2 hours) filed at 07/03/2025 1038 --   BMI (BMI MUST BE CHOSEN) 30 or less filed at 07/03/2025 1038 30 or less filed at 04/09/2025 1517          Modified Frailty Index    No data to display       [id number] Stroke Risk Points  Current as of just now        N/A 0 to 9 Points:      Last Change: N/A          The [id number] risk score (Lip COURTNEY, et al. 2009. © 2010 [employer]) quantifies the risk of stroke for a patient with atrial fibrillation. For patients without atrial fibrillation or under the age of 18 this score appears as N/A. Higher score values generally indicate higher risk of stroke.        This score is not applicable to this patient. Components are not  calculated.          Revised Cardiac Risk Index      Flowsheet Row Pre-Admission Testing from 7/3/2025 in [facility]   High-Risk Surgery (Intraperitoneal, Intrathoracic,Suprainguinal vascular) 0 filed at 07/03/2025 1100   History of ischemic heart disease (History of MI, History of positive exercuse test, Current chest paint considered due to myocardial ischemia, Use of nitrate therapy, ECG with pathological Q Waves) 0 filed at 07/03/2025 1100   History of congestive heart failure (pulmonary edemia, bilateral rales or S3 gallop, Paroxysmal nocturnal dyspnea, CXR showing pulmonary vascular redistribution) 0 filed at 07/03/2025 1100   History of cerebrovascular disease (Prior TIA or stroke) 0 filed at 07/03/2025 1100   Pre-operative insulin treatment 0 filed at 07/03/2025 1100   Pre-operative creatinine>2 mg/dl 0 filed at 07/03/2025 1100   Revised Cardiac Risk Calculator 0 filed at 07/03/2025 1100          Apfel Simplified Score      Flowsheet Row Pre-Admission Testing from 7/3/2025 in [facility]   Smoking status 1 filed at 07/03/2025 1101   History of motion sickness or PONV  0 filed at 07/03/2025 1101   Use of postoperative opioids 1 filed at 07/03/2025 1101   Gender - Female 0=No filed at 07/03/2025 1101   Apfel Simplified Score Calculator 2 filed at 07/03/2025 1101          Risk Analysis Index Results This Encounter    No data found in the last 10 encounters.       Stop Bang Score      Flowsheet Row Pre-Admission Testing from 7/3/2025 in [facility]   Do you snore loudly? 0 filed at 07/03/2025 1017   Do you often feel tired or fatigued after your sleep? 0 filed at 07/03/2025 1017   Has anyone ever observed you stop breathing in your sleep? 0 filed at 07/03/2025 1017   Do you have or are you being treated for high blood pressure? 0 filed at 07/03/2025 1017   Recent BMI (Calculated) 23.2 filed at 07/03/2025 1017   Is BMI greater than 35 kg/m2? 0=No filed at 07/03/2025 1017    Age older than 50 years old? 1=Yes filed at 07/03/2025 1017   Is your neck circumference greater than 17 inches (Male) or 16 inches (Female)? 0 filed at 07/03/2025 1017   Gender - Male 1=Yes filed at 07/03/2025 1017   STOP-BANG Total Score 2 filed at 07/03/2025 1017          Prodigy: High Risk  Total Score: 19              Prodigy Age Score      Prodigy Gender Score     Prodigy Previous Opioid Use Score           ARISCAT Score for Postoperative Pulmonary Complications      Flowsheet Row Pre-Admission Testing from 7/3/2025 in [facility]   Age Calculated Score 3 filed at 07/03/2025 1101   Preoperative SpO2 0 filed at 07/03/2025 1101   Respiratory infection in the last month Either upper or lower (i.e., URI, bronchitis, pneumonia), with fever and antibiotic treatment 0 filed at 07/03/2025 1101   Preoperative anemia (Hgb less than 10 g/dl) 0 filed at 07/03/2025 1101   Surgical incision  0 filed at 07/03/2025 1101   Duration of surgery  0 filed at 07/03/2025 1101   Emergency Procedure  0 filed at 07/03/2025 1101   ARISCAT Total Score  3 filed at 07/03/2025 1101          Azul Perioperative Risk for Myocardial Infarction or Cardiac Arrest (JUS)      Flowsheet Row Pre-Admission Testing from 7/3/2025 in [facility]   Calculated Age Score 1.32 filed at 07/03/2025 1101   Functional Status  0 filed at 07/03/2025 1101   ASA Class  -3.29 filed at 07/03/2025 1101   Creatinine 0 filed at 07/03/2025 1101   Type of Procedure  0.21 filed at 07/03/2025 1101   JUS Total Score  -7.01 filed at 07/03/2025 1101   JUS % 0.09 filed at 07/03/2025 1101                Assessment and Plan:       HPI 64 y/o male scheduled for L4-5 minimally invasive decompression and fusion on 7/18/2025 with  Dr. Waters secondary to lumbar radiculopathy.  PMHX includes neurogenic claudication.  PAT is consulted today for perioperative risk stratification and optimization.    Neuro:  No neurologic diagnosis or significant findings  on chart review, clinical presentation and evaluation.  No grossly apparent neurologic perioperative risk.    Patient is not at increased risk for perioperative CVA      HEENT:  No HEENT diagnosis or significant findings on chart review or clinical presentation and evaluation. No further preoperative testing/intervention indicated at this time.    Cardiovascular:  No CV diagnosis or significant findings on chart review or clinical presentation and evaluation. No further preoperative testing or intervention is indicated at this time.  METS: 5.1  RCRI: 0 points, 3.9%  risk for postoperative MACE       Pulmonary:  No pulmonary diagnosis, however patient is at increased risk of perioperative complications secondary to  age > 60  Stop Bang score is 2 placing patient at low risk for CHRISTIAN  PRODIGY: Moderate risk for opioid induced respiratory depression  Pumonary education discussed, patient also provided deep breathing exerciseswith educational handout    Renal:   No renal diagnosis, however patient is at increase risk for perioperative renal complications secondary to  Age equal to or greater than 56, use of an ace, arb, or NSAID      Endocrine:  No endocrine diagnosis or significant findings on chart review or clinical presentation and evaluation. No further testing or intervention is indicated at this time.    Hematologic:  No hematologic diagnosis, however patient is at an increased risk for DVT  Caprini Score 5, patient at High risk for perioperative DVT.  Patient provided with VTE education/handout.    Gastrointestinal:   No GI diagnosis or significant findings on chart review or clinical presentation and evaluation.   Apfel 2    Orthopedic surgery  Seen by Dr. Waters on 5/29/2025 for lumbar radiculopathy  Plan for L4-5 minimally invasive decompression     Infectious disease:   No infectious diagnosis or significant findings on chart review or clinical presentation and evaluation.   Prescription provided for CHG body  wash and dental rinse. CHG use instructions reviewed and provided to patient.  Staph screen collected    Musculoskeletal:   No diagnosis or significant findings on chart review or clinical presentation and evaluation.     Anesthesia/Airway:  No anesthesia complications      Medication instructions and NPO guidelines reviewed with the patient.  All questions or concerns discussed and addressed.      Labs and EKG ordered                  [1]   Past Medical History:  Diagnosis Date    Personal history of other diseases of the digestive system     History of gastroesophageal reflux (GERD)    Skull fracture (Multi) 2007   [2]   Past Surgical History:  Procedure Laterality Date    OTHER SURGICAL HISTORY  06/27/2019    Cystoscopy    OTHER SURGICAL HISTORY  06/27/2019    Urethral dilation   [3] No family history on file.  [4] No Known Allergies

## 2025-07-03 NOTE — TELEPHONE ENCOUNTER
Stopped in at [facility] before his PAT appointment  He would like refills on medications -- he is out     Requesting   Tramadol 50 mg 1 tablet every 8 hr PRN   Gabapentin 300 mg  1 capsule 1 AM 3 at bedtime     Pain 7/10 all the time     Would like this sent to [facility] (pharmacy on file)       Home [phone]  Cell [phone]

## 2025-07-05 LAB — STAPHYLOCOCCUS SPEC CULT: NORMAL

## 2025-07-07 ENCOUNTER — ANESTHESIA EVENT (OUTPATIENT)
Dept: OPERATING ROOM | Facility: HOSPITAL | Age: 65
End: 2025-07-07
Payer: MEDICARE

## 2025-07-18 ENCOUNTER — ANESTHESIA (OUTPATIENT)
Dept: OPERATING ROOM | Facility: HOSPITAL | Age: 65
End: 2025-07-18
Payer: MEDICARE

## 2025-07-18 ENCOUNTER — APPOINTMENT (OUTPATIENT)
Dept: RADIOLOGY | Facility: HOSPITAL | Age: 65
End: 2025-07-18
Payer: MEDICARE

## 2025-07-18 ENCOUNTER — HOSPITAL ENCOUNTER (OUTPATIENT)
Facility: HOSPITAL | Age: 65
LOS: 1 days | Discharge: HOME | End: 2025-07-19
Attending: ORTHOPAEDIC SURGERY | Admitting: ORTHOPAEDIC SURGERY
Payer: MEDICARE

## 2025-07-18 DIAGNOSIS — M48.062 NEUROGENIC CLAUDICATION DUE TO LUMBAR SPINAL STENOSIS: ICD-10-CM

## 2025-07-18 DIAGNOSIS — M54.16 LUMBAR RADICULOPATHY: Primary | ICD-10-CM

## 2025-07-18 LAB
ABO GROUP (TYPE) IN BLOOD: NORMAL
RH FACTOR (ANTIGEN D): NORMAL

## 2025-07-18 PROCEDURE — 2720000007 HC OR 272 NO HCPCS: Performed by: ORTHOPAEDIC SURGERY

## 2025-07-18 PROCEDURE — 3700000002 HC GENERAL ANESTHESIA TIME - EACH INCREMENTAL 1 MINUTE: Performed by: ORTHOPAEDIC SURGERY

## 2025-07-18 PROCEDURE — 3600000018 HC OR TIME - INITIAL BASE CHARGE - PROCEDURE LEVEL SIX: Performed by: ORTHOPAEDIC SURGERY

## 2025-07-18 PROCEDURE — 22853 INSJ BIOMECHANICAL DEVICE: CPT | Performed by: ORTHOPAEDIC SURGERY

## 2025-07-18 PROCEDURE — 2500000004 HC RX 250 GENERAL PHARMACY W/ HCPCS (ALT 636 FOR OP/ED): Performed by: REGISTERED NURSE

## 2025-07-18 PROCEDURE — 2500000005 HC RX 250 GENERAL PHARMACY W/O HCPCS: Performed by: ORTHOPAEDIC SURGERY

## 2025-07-18 PROCEDURE — 2500000001 HC RX 250 WO HCPCS SELF ADMINISTERED DRUGS (ALT 637 FOR MEDICARE OP): Performed by: PHYSICIAN ASSISTANT

## 2025-07-18 PROCEDURE — 2500000001 HC RX 250 WO HCPCS SELF ADMINISTERED DRUGS (ALT 637 FOR MEDICARE OP): Performed by: ORTHOPAEDIC SURGERY

## 2025-07-18 PROCEDURE — 3700000001 HC GENERAL ANESTHESIA TIME - INITIAL BASE CHARGE: Performed by: ORTHOPAEDIC SURGERY

## 2025-07-18 PROCEDURE — 22853 INSJ BIOMECHANICAL DEVICE: CPT | Performed by: PHYSICIAN ASSISTANT

## 2025-07-18 PROCEDURE — 22840 INSERT SPINE FIXATION DEVICE: CPT | Performed by: PHYSICIAN ASSISTANT

## 2025-07-18 PROCEDURE — 99221 1ST HOSP IP/OBS SF/LOW 40: CPT | Performed by: FAMILY MEDICINE

## 2025-07-18 PROCEDURE — 2780000003 HC OR 278 NO HCPCS: Performed by: ORTHOPAEDIC SURGERY

## 2025-07-18 PROCEDURE — 7100000002 HC RECOVERY ROOM TIME - EACH INCREMENTAL 1 MINUTE: Performed by: ORTHOPAEDIC SURGERY

## 2025-07-18 PROCEDURE — 63052 LAM FACETC/FRMT ARTHRD LUM 1: CPT | Performed by: ORTHOPAEDIC SURGERY

## 2025-07-18 PROCEDURE — 22630 ARTHRD PST TQ 1NTRSPC LUM: CPT | Performed by: PHYSICIAN ASSISTANT

## 2025-07-18 PROCEDURE — 36415 COLL VENOUS BLD VENIPUNCTURE: CPT | Performed by: ORTHOPAEDIC SURGERY

## 2025-07-18 PROCEDURE — 22840 INSERT SPINE FIXATION DEVICE: CPT | Performed by: ORTHOPAEDIC SURGERY

## 2025-07-18 PROCEDURE — 22630 ARTHRD PST TQ 1NTRSPC LUM: CPT | Performed by: ORTHOPAEDIC SURGERY

## 2025-07-18 PROCEDURE — C1713 ANCHOR/SCREW BN/BN,TIS/BN: HCPCS | Performed by: ORTHOPAEDIC SURGERY

## 2025-07-18 PROCEDURE — 3600000017 HC OR TIME - EACH INCREMENTAL 1 MINUTE - PROCEDURE LEVEL SIX: Performed by: ORTHOPAEDIC SURGERY

## 2025-07-18 PROCEDURE — C1734 ORTH/DEVIC/DRUG BN/BN,TIS/BN: HCPCS | Performed by: ORTHOPAEDIC SURGERY

## 2025-07-18 PROCEDURE — 2500000005 HC RX 250 GENERAL PHARMACY W/O HCPCS: Performed by: ANESTHESIOLOGY

## 2025-07-18 PROCEDURE — RXMED WILLOW AMBULATORY MEDICATION CHARGE

## 2025-07-18 PROCEDURE — 2500000004 HC RX 250 GENERAL PHARMACY W/ HCPCS (ALT 636 FOR OP/ED): Performed by: ANESTHESIOLOGY

## 2025-07-18 PROCEDURE — 63052 LAM FACETC/FRMT ARTHRD LUM 1: CPT | Performed by: PHYSICIAN ASSISTANT

## 2025-07-18 PROCEDURE — C1889 IMPLANT/INSERT DEVICE, NOC: HCPCS | Performed by: ORTHOPAEDIC SURGERY

## 2025-07-18 PROCEDURE — 2500000004 HC RX 250 GENERAL PHARMACY W/ HCPCS (ALT 636 FOR OP/ED): Performed by: PHYSICIAN ASSISTANT

## 2025-07-18 PROCEDURE — 76000 FLUOROSCOPY <1 HR PHYS/QHP: CPT

## 2025-07-18 PROCEDURE — C1763 CONN TISS, NON-HUMAN: HCPCS | Performed by: ORTHOPAEDIC SURGERY

## 2025-07-18 PROCEDURE — 7100000001 HC RECOVERY ROOM TIME - INITIAL BASE CHARGE: Performed by: ORTHOPAEDIC SURGERY

## 2025-07-18 PROCEDURE — 7100000011 HC EXTENDED STAY RECOVERY HOURLY - NURSING UNIT

## 2025-07-18 PROCEDURE — 20930 SP BONE ALGRFT MORSEL ADD-ON: CPT | Performed by: ORTHOPAEDIC SURGERY

## 2025-07-18 PROCEDURE — A6010 COLLAGEN BASED WOUND FILLER: HCPCS | Performed by: ORTHOPAEDIC SURGERY

## 2025-07-18 DEVICE — MAXCESS MAS TLIF 2 KIT ACCESS
Type: IMPLANTABLE DEVICE | Site: SPINE LUMBAR | Status: NON-FUNCTIONAL
Brand: MAXCESS

## 2025-07-18 DEVICE — SCREW, RELINE MAS MOD, 6.5 X 45MM P2C: Type: IMPLANTABLE DEVICE | Site: SPINE LUMBAR | Status: FUNCTIONAL

## 2025-07-18 DEVICE — SCREW, RELINE LOCK, 5.5MM OPEN TULIP: Type: IMPLANTABLE DEVICE | Site: SPINE LUMBAR | Status: FUNCTIONAL

## 2025-07-18 DEVICE — NV I-PAS III, BEVELED TIP STERILE: Type: IMPLANTABLE DEVICE | Site: SPINE LUMBAR | Status: NON-FUNCTIONAL

## 2025-07-18 DEVICE — RELINE MAS MOD, REDUCTION EXTENSION: Type: IMPLANTABLE DEVICE | Site: SPINE LUMBAR | Status: FUNCTIONAL

## 2025-07-18 DEVICE — DURAGEN® DURAL GRAFT MATRIX 5 PK, 1X1 DOM
Type: IMPLANTABLE DEVICE | Site: SPINE LUMBAR | Status: FUNCTIONAL
Brand: DURAGEN®

## 2025-07-18 DEVICE — K WIRE, NITINOL, PRECEPT, BEVEL TIP: Type: IMPLANTABLE DEVICE | Site: SPINE LUMBAR | Status: FUNCTIONAL

## 2025-07-18 DEVICE — ROD, RELINE MAS, 5.5 X 40MM, LORDOTIC: Type: IMPLANTABLE DEVICE | Site: SPINE LUMBAR | Status: FUNCTIONAL

## 2025-07-18 DEVICE — IMPLANTABLE DEVICE: Type: IMPLANTABLE DEVICE | Site: SPINE LUMBAR | Status: FUNCTIONAL

## 2025-07-18 DEVICE — SCREW, RELINE MAS RED, 6.5X50MM POLY 2C: Type: IMPLANTABLE DEVICE | Site: SPINE LUMBAR | Status: FUNCTIONAL

## 2025-07-18 RX ORDER — METHOCARBAMOL 100 MG/ML
1000 INJECTION, SOLUTION INTRAMUSCULAR; INTRAVENOUS ONCE
Status: COMPLETED | OUTPATIENT
Start: 2025-07-18 | End: 2025-07-18

## 2025-07-18 RX ORDER — FENTANYL CITRATE 50 UG/ML
INJECTION, SOLUTION INTRAMUSCULAR; INTRAVENOUS AS NEEDED
Status: DISCONTINUED | OUTPATIENT
Start: 2025-07-18 | End: 2025-07-18

## 2025-07-18 RX ORDER — POLYETHYLENE GLYCOL 3350 17 G/17G
17 POWDER, FOR SOLUTION ORAL DAILY
Status: DISCONTINUED | OUTPATIENT
Start: 2025-07-18 | End: 2025-07-19 | Stop reason: HOSPADM

## 2025-07-18 RX ORDER — ONDANSETRON HYDROCHLORIDE 2 MG/ML
INJECTION, SOLUTION INTRAVENOUS AS NEEDED
Status: DISCONTINUED | OUTPATIENT
Start: 2025-07-18 | End: 2025-07-18

## 2025-07-18 RX ORDER — CEFAZOLIN SODIUM 2 G/100ML
2 INJECTION, SOLUTION INTRAVENOUS ONCE
Status: DISCONTINUED | OUTPATIENT
Start: 2025-07-18 | End: 2025-07-18 | Stop reason: HOSPADM

## 2025-07-18 RX ORDER — OXYCODONE HYDROCHLORIDE 5 MG/1
5 TABLET ORAL EVERY 4 HOURS PRN
Qty: 42 TABLET | Refills: 0 | Status: SHIPPED | OUTPATIENT
Start: 2025-07-18 | End: 2025-07-28

## 2025-07-18 RX ORDER — OXYCODONE HYDROCHLORIDE 5 MG/1
5 TABLET ORAL EVERY 4 HOURS PRN
Status: DISCONTINUED | OUTPATIENT
Start: 2025-07-18 | End: 2025-07-18 | Stop reason: HOSPADM

## 2025-07-18 RX ORDER — SODIUM CHLORIDE, SODIUM LACTATE, POTASSIUM CHLORIDE, CALCIUM CHLORIDE 600; 310; 30; 20 MG/100ML; MG/100ML; MG/100ML; MG/100ML
100 INJECTION, SOLUTION INTRAVENOUS CONTINUOUS
Status: DISCONTINUED | OUTPATIENT
Start: 2025-07-18 | End: 2025-07-18 | Stop reason: HOSPADM

## 2025-07-18 RX ORDER — PROCHLORPERAZINE 25 MG/1
25 SUPPOSITORY RECTAL EVERY 12 HOURS PRN
Status: DISCONTINUED | OUTPATIENT
Start: 2025-07-18 | End: 2025-07-19 | Stop reason: HOSPADM

## 2025-07-18 RX ORDER — ALBUTEROL SULFATE 0.83 MG/ML
2.5 SOLUTION RESPIRATORY (INHALATION) ONCE AS NEEDED
Status: DISCONTINUED | OUTPATIENT
Start: 2025-07-18 | End: 2025-07-18 | Stop reason: HOSPADM

## 2025-07-18 RX ORDER — DEXAMETHASONE SODIUM PHOSPHATE 10 MG/ML
8 INJECTION INTRAMUSCULAR; INTRAVENOUS EVERY 8 HOURS SCHEDULED
Status: COMPLETED | OUTPATIENT
Start: 2025-07-18 | End: 2025-07-19

## 2025-07-18 RX ORDER — KETOROLAC TROMETHAMINE 15 MG/ML
15 INJECTION, SOLUTION INTRAMUSCULAR; INTRAVENOUS EVERY 6 HOURS SCHEDULED
Status: DISCONTINUED | OUTPATIENT
Start: 2025-07-18 | End: 2025-07-19 | Stop reason: HOSPADM

## 2025-07-18 RX ORDER — PROPOFOL 10 MG/ML
INJECTION, EMULSION INTRAVENOUS AS NEEDED
Status: DISCONTINUED | OUTPATIENT
Start: 2025-07-18 | End: 2025-07-18

## 2025-07-18 RX ORDER — TRANEXAMIC ACID 10 MG/ML
INJECTION, SOLUTION INTRAVENOUS AS NEEDED
Status: DISCONTINUED | OUTPATIENT
Start: 2025-07-18 | End: 2025-07-18

## 2025-07-18 RX ORDER — GABAPENTIN 300 MG/1
300 CAPSULE ORAL 2 TIMES DAILY
Status: DISCONTINUED | OUTPATIENT
Start: 2025-07-18 | End: 2025-07-19 | Stop reason: HOSPADM

## 2025-07-18 RX ORDER — DIPHENHYDRAMINE HYDROCHLORIDE 50 MG/ML
12.5 INJECTION, SOLUTION INTRAMUSCULAR; INTRAVENOUS ONCE AS NEEDED
Status: DISCONTINUED | OUTPATIENT
Start: 2025-07-18 | End: 2025-07-18 | Stop reason: HOSPADM

## 2025-07-18 RX ORDER — PROCHLORPERAZINE MALEATE 5 MG
10 TABLET ORAL EVERY 6 HOURS PRN
Status: DISCONTINUED | OUTPATIENT
Start: 2025-07-18 | End: 2025-07-19 | Stop reason: HOSPADM

## 2025-07-18 RX ORDER — POLYETHYLENE GLYCOL 3350 17 G/17G
17 POWDER, FOR SOLUTION ORAL 2 TIMES DAILY PRN
COMMUNITY
Start: 2025-07-18 | End: 2025-08-17

## 2025-07-18 RX ORDER — MEPERIDINE HYDROCHLORIDE 25 MG/ML
12.5 INJECTION INTRAMUSCULAR; INTRAVENOUS; SUBCUTANEOUS EVERY 10 MIN PRN
Status: DISCONTINUED | OUTPATIENT
Start: 2025-07-18 | End: 2025-07-18 | Stop reason: HOSPADM

## 2025-07-18 RX ORDER — PHENYLEPHRINE HCL IN 0.9% NACL 0.4MG/10ML
SYRINGE (ML) INTRAVENOUS AS NEEDED
Status: DISCONTINUED | OUTPATIENT
Start: 2025-07-18 | End: 2025-07-18

## 2025-07-18 RX ORDER — CEFAZOLIN SODIUM 2 G/50ML
2 SOLUTION INTRAVENOUS EVERY 8 HOURS
Status: COMPLETED | OUTPATIENT
Start: 2025-07-18 | End: 2025-07-19

## 2025-07-18 RX ORDER — NALOXONE HYDROCHLORIDE 0.4 MG/ML
0.2 INJECTION, SOLUTION INTRAMUSCULAR; INTRAVENOUS; SUBCUTANEOUS EVERY 5 MIN PRN
Status: DISCONTINUED | OUTPATIENT
Start: 2025-07-18 | End: 2025-07-19 | Stop reason: HOSPADM

## 2025-07-18 RX ORDER — MIDAZOLAM HYDROCHLORIDE 1 MG/ML
INJECTION, SOLUTION INTRAMUSCULAR; INTRAVENOUS AS NEEDED
Status: DISCONTINUED | OUTPATIENT
Start: 2025-07-18 | End: 2025-07-18

## 2025-07-18 RX ORDER — KETOROLAC TROMETHAMINE 30 MG/ML
INJECTION, SOLUTION INTRAMUSCULAR; INTRAVENOUS AS NEEDED
Status: DISCONTINUED | OUTPATIENT
Start: 2025-07-18 | End: 2025-07-18

## 2025-07-18 RX ORDER — DEXTROSE 50 % IN WATER (D50W) INTRAVENOUS SYRINGE
12.5
Status: DISCONTINUED | OUTPATIENT
Start: 2025-07-18 | End: 2025-07-19 | Stop reason: HOSPADM

## 2025-07-18 RX ORDER — SODIUM CHLORIDE, SODIUM LACTATE, POTASSIUM CHLORIDE, CALCIUM CHLORIDE 600; 310; 30; 20 MG/100ML; MG/100ML; MG/100ML; MG/100ML
20 INJECTION, SOLUTION INTRAVENOUS CONTINUOUS
Status: DISCONTINUED | OUTPATIENT
Start: 2025-07-18 | End: 2025-07-18

## 2025-07-18 RX ORDER — PROCHLORPERAZINE EDISYLATE 5 MG/ML
10 INJECTION INTRAMUSCULAR; INTRAVENOUS EVERY 6 HOURS PRN
Status: DISCONTINUED | OUTPATIENT
Start: 2025-07-18 | End: 2025-07-19 | Stop reason: HOSPADM

## 2025-07-18 RX ORDER — BISACODYL 5 MG
10 TABLET, DELAYED RELEASE (ENTERIC COATED) ORAL DAILY PRN
Status: DISCONTINUED | OUTPATIENT
Start: 2025-07-18 | End: 2025-07-19 | Stop reason: HOSPADM

## 2025-07-18 RX ORDER — ROCURONIUM BROMIDE 10 MG/ML
INJECTION, SOLUTION INTRAVENOUS AS NEEDED
Status: DISCONTINUED | OUTPATIENT
Start: 2025-07-18 | End: 2025-07-18

## 2025-07-18 RX ORDER — LIDOCAINE HCL/PF 100 MG/5ML
SYRINGE (ML) INTRAVENOUS AS NEEDED
Status: DISCONTINUED | OUTPATIENT
Start: 2025-07-18 | End: 2025-07-18

## 2025-07-18 RX ORDER — ACETAMINOPHEN 325 MG/1
975 TABLET ORAL ONCE
Status: COMPLETED | OUTPATIENT
Start: 2025-07-18 | End: 2025-07-18

## 2025-07-18 RX ORDER — ONDANSETRON HYDROCHLORIDE 2 MG/ML
4 INJECTION, SOLUTION INTRAVENOUS EVERY 8 HOURS PRN
Status: DISCONTINUED | OUTPATIENT
Start: 2025-07-18 | End: 2025-07-19 | Stop reason: HOSPADM

## 2025-07-18 RX ORDER — METHOCARBAMOL 500 MG/1
1000 TABLET, FILM COATED ORAL 3 TIMES DAILY
Status: DISCONTINUED | OUTPATIENT
Start: 2025-07-18 | End: 2025-07-19 | Stop reason: HOSPADM

## 2025-07-18 RX ORDER — ONDANSETRON 4 MG/1
4 TABLET, ORALLY DISINTEGRATING ORAL EVERY 8 HOURS PRN
Status: DISCONTINUED | OUTPATIENT
Start: 2025-07-18 | End: 2025-07-19 | Stop reason: HOSPADM

## 2025-07-18 RX ORDER — ACETAMINOPHEN 325 MG/1
975 TABLET ORAL EVERY 8 HOURS SCHEDULED
Status: DISCONTINUED | OUTPATIENT
Start: 2025-07-18 | End: 2025-07-19 | Stop reason: HOSPADM

## 2025-07-18 RX ORDER — SODIUM CHLORIDE, SODIUM LACTATE, POTASSIUM CHLORIDE, CALCIUM CHLORIDE 600; 310; 30; 20 MG/100ML; MG/100ML; MG/100ML; MG/100ML
100 INJECTION, SOLUTION INTRAVENOUS CONTINUOUS
Status: DISCONTINUED | OUTPATIENT
Start: 2025-07-18 | End: 2025-07-19

## 2025-07-18 RX ORDER — DEXTROSE 50 % IN WATER (D50W) INTRAVENOUS SYRINGE
25
Status: DISCONTINUED | OUTPATIENT
Start: 2025-07-18 | End: 2025-07-19 | Stop reason: HOSPADM

## 2025-07-18 RX ORDER — DROPERIDOL 2.5 MG/ML
0.62 INJECTION, SOLUTION INTRAMUSCULAR; INTRAVENOUS ONCE AS NEEDED
Status: COMPLETED | OUTPATIENT
Start: 2025-07-18 | End: 2025-07-18

## 2025-07-18 RX ORDER — ACETAMINOPHEN 500 MG
1000 TABLET ORAL EVERY 8 HOURS
COMMUNITY
Start: 2025-07-18 | End: 2025-08-01

## 2025-07-18 RX ORDER — OXYCODONE HYDROCHLORIDE 5 MG/1
5 TABLET ORAL EVERY 4 HOURS PRN
Status: DISCONTINUED | OUTPATIENT
Start: 2025-07-18 | End: 2025-07-19 | Stop reason: HOSPADM

## 2025-07-18 RX ORDER — OXYCODONE HYDROCHLORIDE 10 MG/1
10 TABLET ORAL EVERY 4 HOURS PRN
Status: DISCONTINUED | OUTPATIENT
Start: 2025-07-18 | End: 2025-07-19 | Stop reason: HOSPADM

## 2025-07-18 RX ORDER — CEFAZOLIN 1 G/1
INJECTION, POWDER, FOR SOLUTION INTRAVENOUS AS NEEDED
Status: DISCONTINUED | OUTPATIENT
Start: 2025-07-18 | End: 2025-07-18

## 2025-07-18 RX ORDER — GABAPENTIN 300 MG/1
600 CAPSULE ORAL ONCE
Status: COMPLETED | OUTPATIENT
Start: 2025-07-18 | End: 2025-07-18

## 2025-07-18 RX ORDER — ONDANSETRON HYDROCHLORIDE 2 MG/ML
4 INJECTION, SOLUTION INTRAVENOUS ONCE AS NEEDED
Status: DISCONTINUED | OUTPATIENT
Start: 2025-07-18 | End: 2025-07-18 | Stop reason: HOSPADM

## 2025-07-18 RX ORDER — SODIUM CHLORIDE 0.9 G/100ML
INJECTION, SOLUTION IRRIGATION AS NEEDED
Status: DISCONTINUED | OUTPATIENT
Start: 2025-07-18 | End: 2025-07-18 | Stop reason: HOSPADM

## 2025-07-18 RX ORDER — METHOCARBAMOL 500 MG/1
TABLET, FILM COATED ORAL
Qty: 60 TABLET | Refills: 2 | Status: SHIPPED | OUTPATIENT
Start: 2025-07-18

## 2025-07-18 RX ADMIN — METHOCARBAMOL 1000 MG: 500 TABLET ORAL at 15:43

## 2025-07-18 RX ADMIN — CEFAZOLIN 2 G: 330 INJECTION, POWDER, FOR SOLUTION INTRAMUSCULAR; INTRAVENOUS at 07:43

## 2025-07-18 RX ADMIN — DEXAMETHASONE SODIUM PHOSPHATE 8 MG: 10 INJECTION, SOLUTION INTRAMUSCULAR; INTRAVENOUS at 21:07

## 2025-07-18 RX ADMIN — PROCHLORPERAZINE EDISYLATE 10 MG: 5 INJECTION INTRAMUSCULAR; INTRAVENOUS at 13:13

## 2025-07-18 RX ADMIN — HYDROMORPHONE HYDROCHLORIDE 0.4 MG: 1 INJECTION, SOLUTION INTRAMUSCULAR; INTRAVENOUS; SUBCUTANEOUS at 19:43

## 2025-07-18 RX ADMIN — PROPOFOL 120 MG: 10 INJECTION, EMULSION INTRAVENOUS at 07:38

## 2025-07-18 RX ADMIN — ONDANSETRON 4 MG: 2 INJECTION, SOLUTION INTRAMUSCULAR; INTRAVENOUS at 08:50

## 2025-07-18 RX ADMIN — SODIUM CHLORIDE, SODIUM LACTATE, POTASSIUM CHLORIDE, AND CALCIUM CHLORIDE 100 ML/HR: .6; .31; .03; .02 INJECTION, SOLUTION INTRAVENOUS at 19:43

## 2025-07-18 RX ADMIN — DEXAMETHASONE SODIUM PHOSPHATE 8 MG: 10 INJECTION, SOLUTION INTRAMUSCULAR; INTRAVENOUS at 13:05

## 2025-07-18 RX ADMIN — CEFAZOLIN SODIUM 2 G: 2 SOLUTION INTRAVENOUS at 15:42

## 2025-07-18 RX ADMIN — DROPERIDOL 0.62 MG: 2.5 INJECTION, SOLUTION INTRAMUSCULAR; INTRAVENOUS at 09:36

## 2025-07-18 RX ADMIN — TRANEXAMIC ACID 1000 MG: 10 INJECTION, SOLUTION INTRAVENOUS at 08:58

## 2025-07-18 RX ADMIN — OXYCODONE HYDROCHLORIDE 10 MG: 10 TABLET ORAL at 17:15

## 2025-07-18 RX ADMIN — HYDROMORPHONE HYDROCHLORIDE 0.4 MG: 2 INJECTION, SOLUTION INTRAMUSCULAR; INTRAVENOUS; SUBCUTANEOUS at 08:11

## 2025-07-18 RX ADMIN — MIDAZOLAM 2 MG: 1 INJECTION INTRAMUSCULAR; INTRAVENOUS at 07:30

## 2025-07-18 RX ADMIN — Medication 80 MCG: at 09:03

## 2025-07-18 RX ADMIN — DEXAMETHASONE SODIUM PHOSPHATE 10 MG: 4 INJECTION INTRA-ARTICULAR; INTRALESIONAL; INTRAMUSCULAR; INTRAVENOUS; SOFT TISSUE at 07:43

## 2025-07-18 RX ADMIN — HYDROMORPHONE HYDROCHLORIDE 0.4 MG: 2 INJECTION, SOLUTION INTRAMUSCULAR; INTRAVENOUS; SUBCUTANEOUS at 09:02

## 2025-07-18 RX ADMIN — OXYCODONE HYDROCHLORIDE 10 MG: 10 TABLET ORAL at 13:00

## 2025-07-18 RX ADMIN — HYDROMORPHONE HYDROCHLORIDE 0.5 MG: 1 INJECTION, SOLUTION INTRAMUSCULAR; INTRAVENOUS; SUBCUTANEOUS at 10:26

## 2025-07-18 RX ADMIN — FENTANYL CITRATE 50 MCG: 50 INJECTION, SOLUTION INTRAMUSCULAR; INTRAVENOUS at 07:38

## 2025-07-18 RX ADMIN — HYDROMORPHONE HYDROCHLORIDE 0.5 MG: 1 INJECTION, SOLUTION INTRAMUSCULAR; INTRAVENOUS; SUBCUTANEOUS at 09:30

## 2025-07-18 RX ADMIN — Medication 80 MCG: at 08:29

## 2025-07-18 RX ADMIN — Medication 80 MCG: at 09:02

## 2025-07-18 RX ADMIN — LIDOCAINE HYDROCHLORIDE 40 MG: 20 INJECTION INTRAVENOUS at 09:00

## 2025-07-18 RX ADMIN — OXYCODONE HYDROCHLORIDE 10 MG: 10 TABLET ORAL at 22:46

## 2025-07-18 RX ADMIN — KETOROLAC TROMETHAMINE 15 MG: 15 INJECTION, SOLUTION INTRAMUSCULAR; INTRAVENOUS at 21:07

## 2025-07-18 RX ADMIN — ACETAMINOPHEN 975 MG: 325 TABLET ORAL at 07:14

## 2025-07-18 RX ADMIN — KETOROLAC TROMETHAMINE 15 MG: 15 INJECTION, SOLUTION INTRAMUSCULAR; INTRAVENOUS at 15:43

## 2025-07-18 RX ADMIN — ROCURONIUM BROMIDE 50 MG: 10 INJECTION, SOLUTION INTRAVENOUS at 07:39

## 2025-07-18 RX ADMIN — ACETAMINOPHEN 975 MG: 325 TABLET ORAL at 13:00

## 2025-07-18 RX ADMIN — METHOCARBAMOL 1000 MG: 500 TABLET ORAL at 21:05

## 2025-07-18 RX ADMIN — Medication 80 MCG: at 07:52

## 2025-07-18 RX ADMIN — FENTANYL CITRATE 50 MCG: 50 INJECTION, SOLUTION INTRAMUSCULAR; INTRAVENOUS at 07:57

## 2025-07-18 RX ADMIN — ACETAMINOPHEN 975 MG: 325 TABLET ORAL at 21:05

## 2025-07-18 RX ADMIN — HYDROMORPHONE HYDROCHLORIDE 0.5 MG: 1 INJECTION, SOLUTION INTRAMUSCULAR; INTRAVENOUS; SUBCUTANEOUS at 09:36

## 2025-07-18 RX ADMIN — HYDROMORPHONE HYDROCHLORIDE 0.5 MG: 1 INJECTION, SOLUTION INTRAMUSCULAR; INTRAVENOUS; SUBCUTANEOUS at 10:09

## 2025-07-18 RX ADMIN — POVIDONE-IODINE 1 APPLICATION: 5 SOLUTION TOPICAL at 07:15

## 2025-07-18 RX ADMIN — GABAPENTIN 600 MG: 300 CAPSULE ORAL at 07:15

## 2025-07-18 RX ADMIN — KETOROLAC TROMETHAMINE 15 MG: 30 INJECTION, SOLUTION INTRAMUSCULAR; INTRAVENOUS at 08:58

## 2025-07-18 RX ADMIN — Medication 6 L/MIN: at 09:17

## 2025-07-18 RX ADMIN — Medication 80 MCG: at 08:41

## 2025-07-18 RX ADMIN — SUGAMMADEX 200 MG: 100 INJECTION, SOLUTION INTRAVENOUS at 09:02

## 2025-07-18 RX ADMIN — SODIUM CHLORIDE, SODIUM LACTATE, POTASSIUM CHLORIDE, AND CALCIUM CHLORIDE 20 ML/HR: .6; .31; .03; .02 INJECTION, SOLUTION INTRAVENOUS at 07:24

## 2025-07-18 RX ADMIN — GABAPENTIN 300 MG: 300 CAPSULE ORAL at 13:01

## 2025-07-18 RX ADMIN — GABAPENTIN 300 MG: 300 CAPSULE ORAL at 21:05

## 2025-07-18 RX ADMIN — METHOCARBAMOL 1000 MG: 1000 INJECTION, SOLUTION INTRAMUSCULAR; INTRAVENOUS at 09:30

## 2025-07-18 RX ADMIN — HYDROMORPHONE HYDROCHLORIDE 0.5 MG: 1 INJECTION, SOLUTION INTRAMUSCULAR; INTRAVENOUS; SUBCUTANEOUS at 09:48

## 2025-07-18 RX ADMIN — TRANEXAMIC ACID 1000 MG: 10 INJECTION, SOLUTION INTRAVENOUS at 07:49

## 2025-07-18 RX ADMIN — LIDOCAINE HYDROCHLORIDE 60 MG: 20 INJECTION INTRAVENOUS at 07:38

## 2025-07-18 SDOH — ECONOMIC STABILITY: FOOD INSECURITY: WITHIN THE PAST 12 MONTHS, THE FOOD YOU BOUGHT JUST DIDN'T LAST AND YOU DIDN'T HAVE MONEY TO GET MORE.: NEVER TRUE

## 2025-07-18 SDOH — ECONOMIC STABILITY: HOUSING INSECURITY: IN THE LAST 12 MONTHS, WAS THERE A TIME WHEN YOU WERE NOT ABLE TO PAY THE MORTGAGE OR RENT ON TIME?: NO

## 2025-07-18 SDOH — SOCIAL STABILITY: SOCIAL INSECURITY: HAS ANYONE EVER THREATENED TO HURT YOUR FAMILY OR YOUR PETS?: NO

## 2025-07-18 SDOH — HEALTH STABILITY: MENTAL HEALTH: HOW OFTEN DO YOU HAVE A DRINK CONTAINING ALCOHOL?: NEVER

## 2025-07-18 SDOH — SOCIAL STABILITY: SOCIAL INSECURITY: ABUSE: ADULT

## 2025-07-18 SDOH — HEALTH STABILITY: MENTAL HEALTH: HOW MANY DRINKS CONTAINING ALCOHOL DO YOU HAVE ON A TYPICAL DAY WHEN YOU ARE DRINKING?: PATIENT DOES NOT DRINK

## 2025-07-18 SDOH — SOCIAL STABILITY: SOCIAL INSECURITY: DOES ANYONE TRY TO KEEP YOU FROM HAVING/CONTACTING OTHER FRIENDS OR DOING THINGS OUTSIDE YOUR HOME?: NO

## 2025-07-18 SDOH — HEALTH STABILITY: MENTAL HEALTH: HOW OFTEN DO YOU HAVE SIX OR MORE DRINKS ON ONE OCCASION?: NEVER

## 2025-07-18 SDOH — ECONOMIC STABILITY: HOUSING INSECURITY: AT ANY TIME IN THE PAST 12 MONTHS, WERE YOU HOMELESS OR LIVING IN A SHELTER (INCLUDING NOW)?: NO

## 2025-07-18 SDOH — SOCIAL STABILITY: SOCIAL INSECURITY: WITHIN THE LAST YEAR, HAVE YOU BEEN HUMILIATED OR EMOTIONALLY ABUSED IN OTHER WAYS BY YOUR PARTNER OR EX-PARTNER?: NO

## 2025-07-18 SDOH — ECONOMIC STABILITY: INCOME INSECURITY: IN THE PAST 12 MONTHS HAS THE ELECTRIC, GAS, OIL, OR WATER COMPANY THREATENED TO SHUT OFF SERVICES IN YOUR HOME?: NO

## 2025-07-18 SDOH — ECONOMIC STABILITY: HOUSING INSECURITY: IN THE PAST 12 MONTHS, HOW MANY TIMES HAVE YOU MOVED WHERE YOU WERE LIVING?: 0

## 2025-07-18 SDOH — ECONOMIC STABILITY: FOOD INSECURITY: HOW HARD IS IT FOR YOU TO PAY FOR THE VERY BASICS LIKE FOOD, HOUSING, MEDICAL CARE, AND HEATING?: NOT VERY HARD

## 2025-07-18 SDOH — ECONOMIC STABILITY: FOOD INSECURITY: WITHIN THE PAST 12 MONTHS, YOU WORRIED THAT YOUR FOOD WOULD RUN OUT BEFORE YOU GOT THE MONEY TO BUY MORE.: NEVER TRUE

## 2025-07-18 SDOH — HEALTH STABILITY: MENTAL HEALTH: CURRENT SMOKER: 0

## 2025-07-18 SDOH — SOCIAL STABILITY: SOCIAL INSECURITY: ARE YOU OR HAVE YOU BEEN THREATENED OR ABUSED PHYSICALLY, EMOTIONALLY, OR SEXUALLY BY ANYONE?: NO

## 2025-07-18 SDOH — ECONOMIC STABILITY: TRANSPORTATION INSECURITY: IN THE PAST 12 MONTHS, HAS LACK OF TRANSPORTATION KEPT YOU FROM MEDICAL APPOINTMENTS OR FROM GETTING MEDICATIONS?: NO

## 2025-07-18 SDOH — SOCIAL STABILITY: SOCIAL INSECURITY: WITHIN THE LAST YEAR, HAVE YOU BEEN AFRAID OF YOUR PARTNER OR EX-PARTNER?: NO

## 2025-07-18 SDOH — HEALTH STABILITY: MENTAL HEALTH
DO YOU FEEL STRESS - TENSE, RESTLESS, NERVOUS, OR ANXIOUS, OR UNABLE TO SLEEP AT NIGHT BECAUSE YOUR MIND IS TROUBLED ALL THE TIME - THESE DAYS?: NOT AT ALL

## 2025-07-18 SDOH — SOCIAL STABILITY: SOCIAL INSECURITY: ARE THERE ANY APPARENT SIGNS OF INJURIES/BEHAVIORS THAT COULD BE RELATED TO ABUSE/NEGLECT?: NO

## 2025-07-18 SDOH — SOCIAL STABILITY: SOCIAL INSECURITY: DO YOU FEEL ANYONE HAS EXPLOITED OR TAKEN ADVANTAGE OF YOU FINANCIALLY OR OF YOUR PERSONAL PROPERTY?: NO

## 2025-07-18 SDOH — SOCIAL STABILITY: SOCIAL INSECURITY: WERE YOU ABLE TO COMPLETE ALL THE BEHAVIORAL HEALTH SCREENINGS?: YES

## 2025-07-18 SDOH — SOCIAL STABILITY: SOCIAL INSECURITY: HAVE YOU HAD THOUGHTS OF HARMING ANYONE ELSE?: NO

## 2025-07-18 SDOH — SOCIAL STABILITY: SOCIAL INSECURITY: HAVE YOU HAD ANY THOUGHTS OF HARMING ANYONE ELSE?: NO

## 2025-07-18 SDOH — SOCIAL STABILITY: SOCIAL INSECURITY: DO YOU FEEL UNSAFE GOING BACK TO THE PLACE WHERE YOU ARE LIVING?: NO

## 2025-07-18 ASSESSMENT — COGNITIVE AND FUNCTIONAL STATUS - GENERAL
DAILY ACTIVITIY SCORE: 24
DAILY ACTIVITIY SCORE: 24
MOBILITY SCORE: 24
MOBILITY SCORE: 24
PATIENT BASELINE BEDBOUND: NO

## 2025-07-18 ASSESSMENT — PAIN DESCRIPTION - LOCATION
LOCATION: BACK

## 2025-07-18 ASSESSMENT — PAIN SCALES - GENERAL
PAINLEVEL_OUTOF10: 8
PAINLEVEL_OUTOF10: 9
PAINLEVEL_OUTOF10: 8
PAINLEVEL_OUTOF10: 5 - MODERATE PAIN
PAINLEVEL_OUTOF10: 9
PAINLEVEL_OUTOF10: 10 - WORST POSSIBLE PAIN
PAINLEVEL_OUTOF10: 9
PAINLEVEL_OUTOF10: 9
PAINLEVEL_OUTOF10: 7
PAINLEVEL_OUTOF10: 4
PAINLEVEL_OUTOF10: 8
PAINLEVEL_OUTOF10: 7
PAINLEVEL_OUTOF10: 8
PAINLEVEL_OUTOF10: 10 - WORST POSSIBLE PAIN
PAINLEVEL_OUTOF10: 3
PAINLEVEL_OUTOF10: 9

## 2025-07-18 ASSESSMENT — PAIN - FUNCTIONAL ASSESSMENT
PAIN_FUNCTIONAL_ASSESSMENT: 0-10
PAIN_FUNCTIONAL_ASSESSMENT: UNABLE TO SELF-REPORT
PAIN_FUNCTIONAL_ASSESSMENT: 0-10

## 2025-07-18 ASSESSMENT — PAIN DESCRIPTION - DESCRIPTORS
DESCRIPTORS: DISCOMFORT;BURNING
DESCRIPTORS: JABBING;DISCOMFORT
DESCRIPTORS: DISCOMFORT;CRUSHING
DESCRIPTORS: BURNING
DESCRIPTORS: DISCOMFORT;ACHING
DESCRIPTORS: DISCOMFORT;ACHING
DESCRIPTORS: DISCOMFORT;BURNING;ACHING
DESCRIPTORS: ACHING;BURNING
DESCRIPTORS: DISCOMFORT;BURNING
DESCRIPTORS: HEAVINESS;JABBING
DESCRIPTORS: BURNING;CRUSHING

## 2025-07-18 ASSESSMENT — LIFESTYLE VARIABLES
SKIP TO QUESTIONS 9-10: 1
HOW OFTEN DO YOU HAVE 6 OR MORE DRINKS ON ONE OCCASION: NEVER
AUDIT-C TOTAL SCORE: 0
SKIP TO QUESTIONS 9-10: 1
HOW OFTEN DO YOU HAVE A DRINK CONTAINING ALCOHOL: NEVER
AUDIT-C TOTAL SCORE: 0
AUDIT-C TOTAL SCORE: 0
HOW MANY STANDARD DRINKS CONTAINING ALCOHOL DO YOU HAVE ON A TYPICAL DAY: PATIENT DOES NOT DRINK

## 2025-07-18 ASSESSMENT — ACTIVITIES OF DAILY LIVING (ADL)
JUDGMENT_ADEQUATE_SAFELY_COMPLETE_DAILY_ACTIVITIES: YES
PATIENT'S MEMORY ADEQUATE TO SAFELY COMPLETE DAILY ACTIVITIES?: YES
FEEDING YOURSELF: INDEPENDENT
WALKS IN HOME: INDEPENDENT
LACK_OF_TRANSPORTATION: NO
GROOMING: INDEPENDENT
DRESSING YOURSELF: INDEPENDENT
BATHING: INDEPENDENT
HEARING - RIGHT EAR: FUNCTIONAL
ADEQUATE_TO_COMPLETE_ADL: YES
TOILETING: INDEPENDENT
LACK_OF_TRANSPORTATION: NO
HEARING - LEFT EAR: FUNCTIONAL

## 2025-07-18 ASSESSMENT — PATIENT HEALTH QUESTIONNAIRE - PHQ9
2. FEELING DOWN, DEPRESSED OR HOPELESS: NOT AT ALL
SUM OF ALL RESPONSES TO PHQ9 QUESTIONS 1 & 2: 0
1. LITTLE INTEREST OR PLEASURE IN DOING THINGS: NOT AT ALL

## 2025-07-18 ASSESSMENT — PAIN DESCRIPTION - ORIENTATION
ORIENTATION: LOWER
ORIENTATION: LOWER

## 2025-07-18 NOTE — ANESTHESIA POSTPROCEDURE EVALUATION
Patient: Mateo Smith    Procedure Summary       Date: 07/18/25 Room / Location: [facility] OR 04 / [facility]    Anesthesia Start: 0730 Anesthesia Stop: 0920    Procedure: L4-5 MINIMALLY INVASIVE DECOMPRESSION AND INTERBODY FUSION (Spine Lumbar) Diagnosis:       Lumbar radiculopathy      Neurogenic claudication due to lumbar spinal stenosis      (Lumbar radiculopathy [M54.16])      (Neurogenic claudication due to lumbar spinal stenosis [M48.062])    Surgeons: Walter Waters MD Responsible Provider: Jatinder Soto MD    Anesthesia Type: general ASA Status: 2            Anesthesia Type: general    Vitals Value Taken Time   BP See vitals 07/18/25 09:32   Temp  07/18/25 09:32   Pulse 66 07/18/25 09:17   Resp  07/18/25 09:32   SpO2 98 % 07/18/25 09:17   Vitals shown include unfiled device data.    Anesthesia Post Evaluation    Patient location during evaluation: PACU  Patient participation: complete - patient participated  Level of consciousness: awake  Pain management: adequate  Multimodal analgesia pain management approach  Airway patency: patent  Two or more strategies used to mitigate risk of obstructive sleep apnea  Cardiovascular status: acceptable  Respiratory status: acceptable  Hydration status: acceptable  Postoperative Nausea and Vomiting: none        There were no known notable events for this encounter.

## 2025-07-18 NOTE — CONSULTS
Inpatient consult to Medicine  Consult performed by: Soco Kumar DO  Consult ordered by: Ashley Brush PA-C        Reason For Consult  Medical management    History Of Present Illness  Mateo Smith is a 65 y.o. male with PMH of of lumbar radiculopathy  that presented for scheduled lumbar surgery. Patient denies any current complaints.     Past Medical History  He has a past medical history of BPH (benign prostatic hyperplasia), History of gastroesophageal reflux (GERD), Lumbar radiculopathy, Neurogenic claudication due to lumbar spinal stenosis, and Skull fracture (Multi) (2007).    Surgical History  He has a past surgical history that includes Cystoscopy (06/27/2019) and Urethra surgery (06/27/2019).     Social History  He reports that he quit smoking about 3 months ago. His smoking use included cigarettes. He does not have any smokeless tobacco history on file. He reports that he does not currently use alcohol. He reports that he does not currently use drugs after having used the following drugs: Marijuana.    Family History  Family History[1]     Allergies  Patient has no known allergies.    Review of Systems  Constitutional: denies fever/chills, fatigue  Skin: denies rash, ulcer  HEENT: denies visual changes, congestion, sore throat  Cardio: denies chest pain, palpitations  Resp: denies shortness of breath, cough, wheezing  Gastrointestinal: denies abdominal pain, N/V, diarrhea, constipation  Musculoskeletal: denies edema, weakness  Neuro: denies headache, dizziness, syncope         Physical Exam  Constitutional: alert and oriented x 3, awake, cooperative, no acute distress  Skin: warm and dry  Head/Neck: Normocephalic, atraumatic  Eyes: clear sclera  ENMT: mucous membranes moist  Cardio: Regular rate and rhythm  Resp: CTA bilaterally, good respiratory effort  Gastrointestinal: Soft, nontender, nondistended  Musculoskeletal: resting on side does not want to move after surgery now so unable to  assess ROM  Extremities: No edema, cyanosis, or clubbing  Neuro: alert and oriented x 3  Psychological: Appropriate mood and behavior         Last Recorded Vitals  /74   Pulse 70   Temp 36.5 °C (97.7 °F) (Temporal)   Resp 16   Wt 65 kg (143 lb 4.8 oz)   SpO2 100%     Relevant Results  Scheduled medications  Scheduled Medications[2]  Continuous medications  Continuous Medications[3]  PRN medications  PRN Medications[4]    Results for orders placed or performed during the hospital encounter of 07/18/25 (from the past 24 hours)   VERIFY ABO/Rh Group Test   Result Value Ref Range    ABO TYPE AB     Rh TYPE POS      FL less than 1 hour  Result Date: 7/18/2025  These images are not reportable by radiology and will not be interpreted by  Radiologists.         Assessment/Plan   66yo CM with PMH of lumbar radiculopathy that presented for scheduled lumbar surgery. Medicine was consulted for medical management.    Lumbar Radiculopathy  - mgmt per primary surgical team    Dispo: Patient medically stable for discharge per primary surgical service.     Soco Kumar DO             [1] No family history on file.  [2] acetaminophen, 975 mg, oral, q8h KRYSTEN  ceFAZolin, 2 g, intravenous, q8h  dexAMETHasone, 8 mg, intravenous, q8h KRYSTEN  gabapentin, 300 mg, oral, BID  ketorolac, 15 mg, intravenous, q6h KRYSTEN  methocarbamol, 1,000 mg, oral, TID  polyethylene glycol, 17 g, oral, Daily  [3] lactated Ringer's, 100 mL/hr, Last Rate: 100 mL/hr (07/18/25 1110)  [4] PRN medications: bisacodyl, dextrose, dextrose, glucagon, glucagon, HYDROmorphone, naloxone, ondansetron ODT **OR** ondansetron, oxyCODONE, oxyCODONE, oxygen, prochlorperazine **OR** prochlorperazine **OR** prochlorperazine

## 2025-07-18 NOTE — ANESTHESIA PREPROCEDURE EVALUATION
Patient: Mateo Smith    Procedure Information       Date/Time: 25 07    Procedure: L4-5 MINIMALLY INVASIVE DECOMPRESSION AND INTERBODY FUSION (Spine Lumbar)    Location[facility] /  [facility]    Surgeons: Walter Waters MD          Vitals:    25 0620   BP: 142/73   Pulse: 61   Resp: 16   Temp: 36.5 °C (97.7 °F)   SpO2: 100%       Surgical History[1]  Medical History[2]  Current Medications[3]  Prior to Admission medications   Medication Sig Start Date End Date Taking? Authorizing Provider   acetaminophen (Tylenol) 500 mg tablet Take by mouth every 6 hours if needed for mild pain (1 - 3).   Yes Historical Provider, MD   gabapentin (Neurontin) 300 mg capsule Patient will take gabapentin 300 mg in the morning and 900 mg at bedtime. 7/3/25  Yes Ashley Brush PA-C   ibuprofen 400 mg tablet Take 1 tablet (400 mg) by mouth every 6 hours if needed for moderate pain (4 - 6). Take it with food and never on an empty stomach. 4/10/25  Yes Zaki Cornejo MD   traMADol (Ultram) 50 mg tablet Take 1 tablet (50 mg) by mouth every 8 hours if needed for severe pain (7 - 10). 7/3/25  Yes Ashley Brush PA-C   cyclobenzaprine (Flexeril) 5 mg tablet Take 1 tablet (5 mg) by mouth 3 times a day as needed for muscle spasms.  Patient not taking: Reported on 7/3/2025 4/10/25 7/18/25  Zaki Cornejo MD     RX Allergies[4]  Social History     Tobacco Use    Smoking status: Former     Current packs/day: 0.00     Types: Cigarettes     Quit date: 2025     Years since quittin.2    Smokeless tobacco: Not on file   Substance Use Topics    Alcohol use: Not Currently         Chemistry    Lab Results   Component Value Date/Time     2025 1105    K 4.6 2025 1105     2025 1105    CO2 24 2025 1105    BUN 12 2025 1105    CREATININE 0.74 2025 1105    Lab Results   Component Value Date/Time    CALCIUM 9.7 2025 1105    ALKPHOS 52 04/10/2025 0415    AST 16 04/10/2025 0415     ALT 12 04/10/2025 0415    BILITOT 0.9 04/10/2025 0415          Lab Results   Component Value Date/Time    WBC 7.1 07/03/2025 1105    HGB 14.0 07/03/2025 1105    HCT 42.2 07/03/2025 1105     07/03/2025 1105     Lab Results   Component Value Date/Time    PROTIME 10.4 07/03/2025 1105    INR 0.9 07/03/2025 1105     Encounter Date: 04/09/25   ECG 12 Lead   Result Value    Ventricular Rate 62    Atrial Rate 63    FL Interval 161    QRS Duration 154    QT Interval 433    QTC Calculation(Bazett) 440    P Axis 48    R Axis -74    T Axis 55    QRS Count 10    Q Onset 249    T Offset 466    QTC Fredericia 438    Narrative    Sinus rhythm  RBBB and LAFB    See ED provider note for full interpretation and clinical correlation  Confirmed by Radha Lawson ([id number]) on 4/16/2025 6:37:37 PM     No results found for this or any previous visit from the past 1095 days.    Relevant Problems   Cardiac   (+) Chest pain   (+) Chest pain, unspecified type      Neuro   (+) Lumbar radiculopathy      /Renal   (+) BPH (benign prostatic hyperplasia)      Musculoskeletal   (+) Spinal stenosis of lumbar region with neurogenic claudication       Clinical information reviewed:   Tobacco   Meds  Problems  Med Hx  Surg Hx   Fam Hx  Soc Hx        NPO Detail:  No data recorded     PHYSICAL EXAM    Anesthesia Plan    History of general anesthesia?: yes  History of complications of general anesthesia?: no    ASA 2     general     The patient is not a current smoker.  Patient was not previously instructed to abstain from smoking on day of procedure.  Patient did not smoke on day of procedure.  Education provided regarding risk of obstructive sleep apnea.  intravenous induction   Anesthetic plan and risks discussed with patient.    Plan discussed with CRNA.           [1]   Past Surgical History:  Procedure Laterality Date    CYSTOSCOPY  06/27/2019    URETHRA SURGERY  06/27/2019    Urethral dilation   [2]   Past Medical  History:  Diagnosis Date    BPH (benign prostatic hyperplasia)     History of gastroesophageal reflux (GERD)     Lumbar radiculopathy     Neurogenic claudication due to lumbar spinal stenosis     Skull fracture (Multi) 2007   [3]   Current Facility-Administered Medications:     ceFAZolin (Ancef) 2 g in dextrose (iso)  mL, 2 g, intravenous, Once, Walter Waters MD    lactated Ringer's infusion, 20 mL/hr, intravenous, Continuous, Jatinder Soto MD  [4] No Known Allergies

## 2025-07-18 NOTE — DISCHARGE INSTRUCTIONS
Activity  Progressively walk 2 times a day.  Weight bearing as tolerated.  No pushing, pulling, or lifting objects greater than 10 pounds.  No excessive bending, twisting. No heavy house or yard work.  You may shower when there is no drainage from the incision site for 48 hours.  You may not return to school/work until your follow up appointment.  You may not drive until your follow up appointment or while taking narcotic medication.    Wound Type: Surgical Incision  -Change the dressing daily and continue until the incision is no longer draining.   -Once the incision  has been dry for 48 hours, you may leave the dressing off and the site open to air.  -Cover the wound with an abdominal pad and secure it with paper tape around the edges.  -If there is a Prineo/Exofin dressing (mesh strip) over your incision, do not remove it with your     dressing changes. The dressing will slowly lift away from the skin over time.   -No Lotions or Creams on or around the incision site.  -No tub soaks  -You may use heat or ice to your incision sites and or back as needed for comfort.    Call the office at [phone], option #2 if:  You are breathing faster than normal.  Fever of 100.4 F or higher.  Chills  Urinating less than 4 times per day.  Acting very sleepy and difficult to awaken.  Vomiting and not able to eat or drink for 12 hours  3 or more loose, watery bowel movements in 24 hours (Diarrhea)  Concerns over the appearance of your incision.    Return immediately to the ER:  Persistent bilateral leg pain and or numbness >24 hours.  Perineal numbness/sensory loss  Urinary retention >12 hours  Loss of bowel/ bladder control

## 2025-07-18 NOTE — OP NOTE
L4-5 MINIMALLY INVASIVE DECOMPRESSION AND INTERBODY FUSION Operative Note     Date: 2025  OR Location: [facility] OR    Name: Mateo Smith, : 1960, Age: 65 y.o., MRN: [id number], Sex: male    Diagnosis  Pre-op Diagnosis      * Lumbar radiculopathy [M54.16]     * Neurogenic claudication due to lumbar spinal stenosis [M48.062] Post-op Diagnosis     * Lumbar radiculopathy [M54.16]     * Neurogenic claudication due to lumbar spinal stenosis [M48.062]     Procedures  L4-5 MINIMALLY INVASIVE DECOMPRESSION AND INTERBODY FUSION  81279 - IN ARTHRODESIS POSTERIOR INTERBODY 1 NTRSPC LUMBAR    IN LOGAN FACETEC/FORAMOT DRG ARTHRD LUMBAR 1 VRT SGM [91309]  IN INSJ BIOMCHN DEV INTERVERTEBRAL DSC SPC W/ARTHRD []  IN POSTERIOR NON-SEGMENTAL INSTRUMENTATION []  IN ALLOGRAFT FOR SPINE SURGERY ONLY MORSELIZED []  Surgeons      * Walter Waters - Primary    Resident/Fellow/Other Assistant:  Surgeons and Role:     * Ashley Brush PA-C - ERIKA First Assist    Staff:   Circulator: Lidya Oglesby Person: Jessica  Surgical Assistant: Williams Ling Circulator: Onur    Anesthesia Staff: Anesthesiologist: Jatinder Soto MD  CRNA: LUÍS Hauser-ALENA    Procedure Summary  Anesthesia: General  ASA: II  Estimated Blood Loss: 75mL  Intra-op Medications:   Administrations occurring from 0705 to 0935 on 25:   Medication Name Total Dose   sodium chloride 0.9 % irrigation solution 500 mL   lactated Ringer's infusion 38.67 mL   acetaminophen (Tylenol) tablet 975 mg 975 mg   gabapentin (Neurontin) capsule 600 mg 600 mg   povidone-iodine 5 % kit kit 1 Application   ceFAZolin (Ancef) vial 1 g 2 g   dexAMETHasone (Decadron) 4 mg/mL IV Syringe 2 mL 10 mg   fentaNYL (Sublimaze) injection 50 mcg/mL 100 mcg   HYDROmorphone (Dilaudid) injection 2 mg/mL 0.8 mg   ketorolac (Toradol) injection 30 mg 15 mg   lidocaine (cardiac) injection 2% prefilled syringe 100 mg   midazolam (Versed) injection 1 mg/mL 2 mg   ondansetron  (Zofran) 2 mg/mL injection 4 mg   phenylephrine 40 mcg/mL syringe 10 mL 400 mcg   propofol (Diprivan) injection 10 mg/mL 120 mg   rocuronium (ZeMuron) 50 mg/5 mL injection 50 mg   sugammadex (Bridion) 200 mg/2 mL injection 200 mg   tranexamic acid IV 1,000 mg in 100 mL NaCl (iso) - premix 2,000 mg              Anesthesia Record               Intraprocedure I/O Totals          Intake    Tranexamic Acid 0.00 mL    The total shown is the total volume documented since Anesthesia Start was filed.    lactated Ringer's infusion 800.00 mL    Total Intake 800 mL       Output    Est. Blood Loss 75 mL    Total Output 75 mL       Net    Net Volume 725 mL          Specimen: No specimens collected              Drains and/or Catheters: * None in log *    Tourniquet Times:         Implants:  Implants       Type Name Action Serial No.      Bone OSTEOCELMAX 10CC Implanted [id number]     Screw MAXCESS, MAS TLIF 2 KIT ACCESS - [id number] Used, Not Implanted      Neuro Interventional Implant ACCESS SYSTEM, PEDICLE, I-PAS III, BEVELED TIP, STERILE - [id number] Used, Not Implanted      Neuro Interventional Implant GRAFT, DURAGEN, 1X1 - [id number] Implanted      Spinal Hardware NUVASIVE TLX20, [id number], 20 DEGREES Implanted      Neuro Interventional Implant K WIRE, NITINOL, PRECEPT, BEVEL TIP - [id number] Implanted      Screw SCREW, RELINE MAS RED, 6.5X50MM POLY 2C - [id number] Implanted       6.5X50 SHANK Implanted      Screw SCREW, RELINE MAS MOD, 6.5 X 45MM P2C - [id number] Implanted      Screw RELINE MAS MOD, REDUCTION EXTENSION - [id number] Implanted       LIGHT PIECE Used, Not Implanted      Screw KATHIA, RELINE MAS, 5.5 X 40MM, LORDOTIC - [id number] Implanted      Neuro Interventional Implant SCREW, RELINE LOCK, 5.5MM OPEN TULIP - [id number] Implanted               Findings: Severe central and lateral recess stenosis L4-5    Indications: Mateo Smith is an 65 y.o. male who is having surgery for Lumbar radiculopathy  [M54.16]  Neurogenic claudication due to lumbar spinal stenosis [M48.062].  Patient presented with intractable lumbar radiculopathy and neurogenic claudication.  Failed conservative treatment.  X-rays and MRI demonstrated grade 1 spondylolisthesis at L4-5 with severe central and lateral recess stenosis.    The patient was seen in the preoperative area. The risks, benefits, complications, treatment options, non-operative alternatives, expected recovery and outcomes were discussed with the patient. The possibilities of reaction to medication, pulmonary aspiration, injury to surrounding structures, bleeding, recurrent infection, the need for additional procedures, failure to diagnose a condition, and creating a complication requiring transfusion or operation were discussed with the patient. The patient concurred with the proposed plan, giving informed consent.  The site of surgery was properly noted/marked if necessary per policy. The patient has been actively warmed in preoperative area. Preoperative antibiotics have been ordered and given within 1 hours of incision. Venous thrombosis prophylaxis have been ordered including bilateral sequential compression devices    Procedure Details: Patient was brought back to the operating room and general anesthesia was induced.  SCDs were placed on the lower extremities.  Patient was then positioned prone on the Ronald frame, all bony prominences were well padded.  A time-out was performed and preoperative antibiotics were given.  The lumbar spine was then prepped and draped in the usual sterile fashion.    Under fluoroscopic guidance bilateral Declan incisions were made spanning from pedicle to pedicle at L4-5.  Jamshidi needles were advanced along the pedicles in a standard fashion using fluoroscopy.  Guidewires were placed and screw sizes were measured.  6.5 millimeter pedicle screws with reduction towers were placed along the right side, and 6.5 millimeter headless screws  with the MAS TLIF retractor blades were placed along the left side.  Screw positioning was checked using fluoroscopy.  The retractor was then opened in the cephalad/caudad direction.  Medial blade was placed and opened along the medial lamina.  The left L4-5 facet joint was exposed along with the adjacent pars interarticularis and medial lamina.    A high-speed bur was used to bur out the facet joint.  A hemilaminectomy was then performed using a high-speed bur extending up to the superior edge of the ligamentum flavum.  An osteotome was used to remove the medial facet.  The bur was then used to undercut the spinous process and decompress over the top.  This was carried out until the ligamentum was exposed on the contralateral side.  The ligamentum flavum was excised completely using angled curettes and Kerrison rongeurs.  Bilateral decompression of the lateral recesses was then carried out using Kerrison rongeurs until a [place] elevator was easily passed through each adjacent neural foramen and the traversing nerve root was freely mobile.  Epidural hemostasis was obtained using bipolar electrocautery and FloSeal.  There is a small dural bleb noted dorsolaterally along the right side, no CSF was leaking.  I did place a small piece of DuraGen over this defect and overlay a small amount of DuraSeal exact dural sealant.    The L4-5 disc space was then identified.  An annulotomy was performed using a 15 blade.  A thorough diskectomy was performed using a series of curettes and paddle Lee.  The endplates were prepared using ring curettes.  Trial sizers were placed and a size 10 x 11 x 31 mm TLX 20 degree expandable cage was selected.  The disc space was irrigated, it was then packed with 10 cubic centimeters of osteo cell pro bone graft.  The cage was then inserted into the disc space and opened under fluoroscopic guidance.  The cage was then backfilled with more osteo cell.  The  handle was removed.   Meticulous epidural hemostasis was obtained with bipolar electrocautery and FloSeal.  There was greatly improved alignment and disc space height following placement of the cage.    The retractor was then closed and removed.  Reduction towers were placed along the headless pedicle screws.  Calipers were used to measure richard length and appropriate size rods were placed bilaterally through the reduction towers.  Locking screws were placed and then finally tightened using the torque wrench.  Reduction towers and richard inserters were removed.  Final AP and lateral images were then obtained.    The wounds were then irrigated once again and hemostasis was obtained.  The wounds were closed in layers.  Dermabond Prineo and dry sterile dressings were then applied.    Patient was then transferred off the OR table and awakened.  There were no complications during the case.    I performed this operation with a physician's assistant, as no qualified resident was available.  Ashley Brush was the full and primary assistant during the entire case.      Evidence of Infection: No   Complications:  None; patient tolerated the procedure well.    Disposition: PACU - hemodynamically stable.  Condition: stable       Additional Details:     Attending Attestation: A qualified resident physician was not available.    Walter Waters  Phone Number: [phone]

## 2025-07-18 NOTE — ANESTHESIA PROCEDURE NOTES
Airway  Date/Time: 7/18/2025 7:41 AM  Reason: elective    Airway not difficult    Staffing  Performed: CRNA   Authorized by: Jatinder Soto MD    Performed by: LUÍS Hauser-ALENA  Patient location during procedure: OR    Patient Condition  Indications for airway management: anesthesia and airway protection  Patient position: sniffing  Planned trial extubation  Sedation level: deep     Final Airway Details   Preoxygenated: yes  Final airway type: endotracheal airway  Successful airway: ETT  Cuffed: yes   Successful intubation technique: video laryngoscopy (Reagan)  Adjuncts used in placement: intubating stylet  Endotracheal tube insertion site: oral  Blade: Stephanie  Blade size: #3  ETT size (mm): 7.5  Cormack-Lehane Classification: grade I - full view of glottis  Placement verified by: chest auscultation, capnometry and palpation of cuff   Measured from: lips  ETT to lips (cm): 22  Number of attempts at approach: 1    Additional Comments  atraumatic

## 2025-07-19 VITALS
TEMPERATURE: 98.8 F | HEART RATE: 68 BPM | RESPIRATION RATE: 16 BRPM | SYSTOLIC BLOOD PRESSURE: 133 MMHG | OXYGEN SATURATION: 97 % | HEIGHT: 67 IN | BODY MASS INDEX: 22.49 KG/M2 | WEIGHT: 143.3 LBS | DIASTOLIC BLOOD PRESSURE: 83 MMHG

## 2025-07-19 PROBLEM — M54.16 LUMBAR RADICULOPATHY: Status: RESOLVED | Noted: 2025-02-11 | Resolved: 2025-07-19

## 2025-07-19 PROCEDURE — 7100000011 HC EXTENDED STAY RECOVERY HOURLY - NURSING UNIT

## 2025-07-19 PROCEDURE — 2500000001 HC RX 250 WO HCPCS SELF ADMINISTERED DRUGS (ALT 637 FOR MEDICARE OP): Performed by: PHYSICIAN ASSISTANT

## 2025-07-19 PROCEDURE — 97116 GAIT TRAINING THERAPY: CPT | Mod: GP

## 2025-07-19 PROCEDURE — 97161 PT EVAL LOW COMPLEX 20 MIN: CPT | Mod: GP

## 2025-07-19 PROCEDURE — 2500000004 HC RX 250 GENERAL PHARMACY W/ HCPCS (ALT 636 FOR OP/ED): Mod: JW | Performed by: PHYSICIAN ASSISTANT

## 2025-07-19 RX ADMIN — POLYETHYLENE GLYCOL 3350 17 G: 17 POWDER, FOR SOLUTION ORAL at 08:41

## 2025-07-19 RX ADMIN — DEXAMETHASONE SODIUM PHOSPHATE 8 MG: 10 INJECTION, SOLUTION INTRAMUSCULAR; INTRAVENOUS at 06:18

## 2025-07-19 RX ADMIN — KETOROLAC TROMETHAMINE 15 MG: 15 INJECTION, SOLUTION INTRAMUSCULAR; INTRAVENOUS at 02:52

## 2025-07-19 RX ADMIN — OXYCODONE HYDROCHLORIDE 5 MG: 5 TABLET ORAL at 08:41

## 2025-07-19 RX ADMIN — GABAPENTIN 300 MG: 300 CAPSULE ORAL at 08:41

## 2025-07-19 RX ADMIN — CEFAZOLIN SODIUM 2 G: 2 SOLUTION INTRAVENOUS at 00:19

## 2025-07-19 RX ADMIN — KETOROLAC TROMETHAMINE 15 MG: 15 INJECTION, SOLUTION INTRAMUSCULAR; INTRAVENOUS at 08:41

## 2025-07-19 RX ADMIN — ACETAMINOPHEN 975 MG: 325 TABLET ORAL at 06:18

## 2025-07-19 RX ADMIN — METHOCARBAMOL 1000 MG: 500 TABLET ORAL at 08:41

## 2025-07-19 ASSESSMENT — ACTIVITIES OF DAILY LIVING (ADL)
ADL_ASSISTANCE: INDEPENDENT
LACK_OF_TRANSPORTATION: NO

## 2025-07-19 ASSESSMENT — PAIN SCALES - GENERAL
PAINLEVEL_OUTOF10: 5 - MODERATE PAIN
PAINLEVEL_OUTOF10: 4

## 2025-07-19 ASSESSMENT — COGNITIVE AND FUNCTIONAL STATUS - GENERAL
MOBILITY SCORE: 24
MOBILITY SCORE: 24
DAILY ACTIVITIY SCORE: 24

## 2025-07-19 ASSESSMENT — PAIN - FUNCTIONAL ASSESSMENT
PAIN_FUNCTIONAL_ASSESSMENT: 0-10
PAIN_FUNCTIONAL_ASSESSMENT: 0-10

## 2025-07-19 ASSESSMENT — PAIN DESCRIPTION - DESCRIPTORS: DESCRIPTORS: ACHING

## 2025-07-19 NOTE — PROGRESS NOTES
Doing well this morning  Preop leg pain resolved  Notes moderate back ache, controlled with medication    dressing c/d/i  5/5 strength b/l lower ext  SILT L2-S1    Plan:  OOB ad brennan  PT/OT  Dc home today, patient does not wish for home health care at this time

## 2025-07-19 NOTE — PROGRESS NOTES
Physical Therapy    Physical Therapy Evaluation & Treatment    Patient Name: Mateo Smith  MRN: [id number]  Department:   Room: [address] [place], [place] [address]  Today's Date: 7/19/2025   Time Calculation  Start Time: 0951  Stop Time: 1022  Time Calculation (min): 31 min    Assessment/Plan   PT Assessment  Barriers to Discharge Home: No anticipated barriers (Pt has good support system from children.)  Evaluation/Treatment Tolerance: Patient tolerated treatment well  Medical Staff Made Aware: Yes  Strengths: Ability to acquire knowledge, Attitude of self, Capable of completing ADLs semi/independent, Insight into problems, Premorbid level of function, Rehab experience, Support of Caregivers, Support of extended family/friends  Barriers to Participation: Comorbidities  End of Session Communication: Bedside nurse  Assessment Comment: Recommend LOW intensity skilled therapy in an outpatient setting in the near future to maximize functional outcomes and return to IND, safe baseline LOF with ADLs, leisure and mobility following recent lumbar surgery.  Pt has done outpatient PT in past so demos good understanding of spinal precautions and safe body mechanics.  Pt is IND with mobility and ADLs at this time.  No further skilled PT needs indicated during acute LOS.   Pt in agreement.  End of Session Patient Position: Alarm off, not on at start of session (Pt sitting EOB, all needs within reach.  RN aware.  Pt deemed low falls risk so no alarm needed per RN.)   IP OR SWING BED PT PLAN  Inpatient or Swing Bed: Inpatient  PT Plan  PT Plan:  (PT Eval/ Tx only)  PT Eval Only Reason: At baseline function (Safe to return home, no acute PT needs identified)  PT Frequency:  (PT Eval/ Tx only this date)  PT Discharge Recommendations: Low intensity level of continued care (Outpatient PT services in near future pending post-op progress and surgeon's recommendations/ protocol.)  PT Recommended Transfer Status: Independent  PT - OK to Discharge: Yes (per PT  POC)      Subjective     PT Visit Info:  PT Received On: 07/19/25  General Visit Information:  General  Reason for Referral: 66 y/o male with lumbar radiculopathy s/p elective L4-5 minimally invasive decompression and interbody fusion on 7/18.  PT for impaired mobility.  Referred By: Dr. Walter Waters  Past Medical History Relevant to Rehab: BPH, GERD, lumbar radiculopathy with chronic LBP, neurogenic due to lumbar spinal stenosis, skull fracture (head injury in 2007, reports residual mild difficulties with memory, HA and dizziness).  Family/Caregiver Present: No  Prior to Session Communication: Bedside nurse  Patient Position Received: Bed, 3 rail up, Alarm off, not on at start of session (Sitting EOB)  General Comment: Cleared per RN.  Pt pleasant, cooperative and motivated to participate in therapy.  Pt up IND in room per RN and pt.  Pt demonstrates safe, IND functional mobility.  Pt denies concerns with current functional status and safe homegoing.  Pt demos good understanding and compliance with spinal precautions.  Plan for D/C to home soon following PT eval this date.  Home Living:  Home Living  Type of Home: House  Lives With: Alone (Daughter lives 4 minutes away, other 3 children close by in the area.)  Home Adaptive Equipment: None  Home Layout: Two level, Stairs to alternate level with rails, Bed/bath upstairs, Full bath main level (Reports 6 stairs with 1 HR > landing > 8 stairs with B HR)  Alternate Level Stairs-Rails: Both  Alternate Level Stairs-Number of Steps: 14  Home Access: No concerns  Bathroom Toilet: Handicapped height  Bathroom Equipment: Shower chair with back  Prior Level of Function:  Prior Function Per Pt/Caregiver Report  Level of [place]: Independent with ADLs and functional transfers, Independent with homemaking with ambulation  Receives Help From: Family (Reports having 4 children in the area that can assist as needed.)  ADL Assistance: Independent  Homemaking Assistance:  Independent (Reports daughter recently mowing the lawn for him.)  Ambulatory Assistance: Independent  Vocational: Retired  Leisure: Active, enjoys walking his dog every day.  Prior Function Comments: +Drives  Precautions:  Precautions  Medical Precautions: Fall precautions (Masimo, IV port; Pt deemed low falls risk.)  Post-Surgical Precautions: Spinal precautions    Objective   Pain:  Pain Assessment  Pain Assessment: 0-10  0-10 (Numeric) Pain Score: 5 - Moderate pain  Pain Type: Surgical pain  Pain Location: Back  Pain Orientation: Lower (Surgical site)  Pain Interventions: Medication (See MAR), Repositioned, Ambulation/increased activity (pt recently medicated.)  Response to Interventions: Content/relaxed, Decrease in pain  Cognition:  Cognition  Overall Cognitive Status: Within Functional Limits  Orientation Level: Oriented X4    General Assessments:     Activity Tolerance  Endurance: Endurance does not limit participation in activity    Sensation  Light Touch: No apparent deficits    Strength  Strength Comments: Grossly WFL B LE  Coordination  Movements are Fluid and Coordinated: Yes    Static Sitting Balance  Static Sitting-Balance Support: Feet supported  Static Sitting-Level of Assistance: Independent  Static Sitting-Comment/Number of Minutes: Sitting EOB unuspported, good posture  Dynamic Sitting Balance  Dynamic Sitting-Balance Support: Feet supported  Dynamic Sitting-Level of Assistance: Independent  Dynamic Sitting-Balance: Reaching for objects, Reaching across midline (Trunk movements)    Static Standing Balance  Static Standing-Balance Support: No upper extremity supported  Static Standing-Level of Assistance: Independent  Dynamic Standing Balance  Dynamic Standing-Balance Support: No upper extremity supported  Dynamic Standing-Level of Assistance: Independent  Functional Assessments:  Bed Mobility  Bed Mobility: Yes  Bed Mobility 1  Bed Mobility 1: Supine to sitting  Level of Assistance 1:  Independent  Bed Mobility Comments 1: Pt sitting up to EOB upon therapist arrival.  Pt demonstrated proper log roll technique for comfort and spinal protection.    Transfers  Transfer: Yes  Transfer 1  Transfer From 1: Sit to  Transfer to 1: Stand  Technique 1: Sit to stand, Stand to sit  Transfer Device 1:  (None)  Transfer Level of Assistance 1: Independent  Trials/Comments 1: Completed from EOB.  Pt steady upon standing.  VCs provided to review safety with pausing before initiating ambulation and maintaining good postural alignment.    Ambulation/Gait Training  Ambulation/Gait Training Performed: Yes  Ambulation/Gait Training 1  Surface 1: Level tile  Device 1: No device  Assistance 1: Independent  Quality of Gait 1: Decreased step length (Slower, steady gume)  Comments/Distance (ft) 1: Pt ambulating within room and into hallway x ~200+ feet without AD.  No LOB or unsteadiness observed with direction changes and navigating obstacles.  No significant deviations observed.  Pt demos good safety and awareness of functional status.    Stairs  Stairs: No (Pt denies concerns.)  Extremity/Trunk Assessments:  RUE   RUE : Within Functional Limits  LUE   LUE: Within Functional Limits  RLE   RLE : Within Functional Limits  LLE   LLE : Within Functional Limits  Treatments:  Bed Mobility  Bed Mobility: Yes  Bed Mobility 1  Bed Mobility 1: Supine to sitting  Level of Assistance 1: Independent  Bed Mobility Comments 1: Pt sitting up to EOB upon therapist arrival.  Pt demonstrated proper log roll technique for comfort and spinal protection.    Ambulation/Gait Training  Ambulation/Gait Training Performed: Yes  Ambulation/Gait Training 1  Surface 1: Level tile  Device 1: No device  Assistance 1: Independent  Quality of Gait 1: Decreased step length (Slower, steady gume)  Comments/Distance (ft) 1: Pt ambulating within room and into hallway x ~200+ feet without AD.  No LOB or unsteadiness observed with direction changes and  navigating obstacles.  No significant deviations observed.  Pt demos good safety and awareness of functional status.  Transfers  Transfer: Yes  Transfer 1  Transfer From 1: Sit to  Transfer to 1: Stand  Technique 1: Sit to stand, Stand to sit  Transfer Device 1:  (None)  Transfer Level of Assistance 1: Independent  Trials/Comments 1: Completed from EOB.  Pt steady upon standing.  VCs provided to review safety with pausing before initiating ambulation and maintaining good postural alignment.    Stairs  Stairs: No (Pt denies concerns.)  Outcome Measures:  [facility] Basic Mobility  Turning from your back to your side while in a flat bed without using bedrails: None  Moving from lying on your back to sitting on the side of a flat bed without using bedrails: None  Moving to and from bed to chair (including a wheelchair): None  Standing up from a chair using your arms (e.g. wheelchair or bedside chair): None  To walk in hospital room: None  Climbing 3-5 steps with railing: None  Basic Mobility - Total Score: 24        Education Documentation  Handouts, taught by Trinity Frazier PT at 7/19/2025  1:50 PM.  Learner: Patient  Readiness: Acceptance  Method: Explanation, Demonstration, Handout  Response: Verbalizes Understanding, Demonstrated Understanding    Precautions, taught by Trinity Frazier PT at 7/19/2025  1:50 PM.  Learner: Patient  Readiness: Acceptance  Method: Explanation, Demonstration, Handout  Response: Verbalizes Understanding, Demonstrated Understanding    Body Mechanics, taught by Trinity Frazier PT at 7/19/2025  1:50 PM.  Learner: Patient  Readiness: Acceptance  Method: Explanation, Demonstration, Handout  Response: Verbalizes Understanding, Demonstrated Understanding    Home Exercise Program, taught by Trinity Frazier PT at 7/19/2025  1:50 PM.  Learner: Patient  Readiness: Acceptance  Method: Explanation, Demonstration, Handout  Response: Verbalizes Understanding, Demonstrated  Understanding    Mobility Training, taught by Trinity Frazier, PT at 7/19/2025  1:50 PM.  Learner: Patient  Readiness: Acceptance  Method: Explanation, Demonstration, Handout  Response: Verbalizes Understanding, Demonstrated Understanding    Education Comments  Issued handout for reference on spinal precautions, log roll with bed mobility and safe body mechanics and posture with ADLs and mobility for spinal protection and adequate healing.

## 2025-07-19 NOTE — DISCHARGE SUMMARY
Discharge Diagnosis  Neurogenic claudication due to lumbar spinal stenosis    Issues Requiring Follow-Up  Lumbar surgery    Test Results Pending At Discharge  Pending Labs       No current pending labs.            Hospital Course   Unremarkable hospital course, admitted for pain control/abx s/p lumbar surgery    Visit Vitals  /77   Pulse 60   Temp 37 °C (98.6 °F)   Resp 16     Vitals:    07/18/25 0620   Weight: 65 kg (143 lb 4.8 oz)         There is no immunization history on file for this patient.    Results        Pertinent Physical Exam At Time of Discharge  dressing c/d/i  5/5 strength b/l lower ext  SILT L2-S1      Home Medications     Medication List      START taking these medications     methocarbamol 500 mg tablet; Commonly known as: Robaxin; Take 1-2 tabs   every 8 hours as needed for spasms   oxyCODONE 5 mg immediate release tablet; Commonly known as: Roxicodone;   Take 1 tablet (5 mg) by mouth every 4 hours if needed for severe pain (7 -   10) for up to 7 days.   polyethylene glycol 17 gram/dose powder; Commonly known as: Miralax; Mix   17 g of powder and drink 2 times a day as needed (constipation).     CHANGE how you take these medications     acetaminophen 500 mg tablet; Commonly known as: Tylenol Extra Strength;   Take 2 tablets (1,000 mg) by mouth every 8 hours for 14 days.; What   changed: how much to take, when to take this, reasons to take this     CONTINUE taking these medications     gabapentin 300 mg capsule; Commonly known as: Neurontin; Patient will   take gabapentin 300 mg in the morning and 900 mg at bedtime.     STOP taking these medications     ibuprofen 400 mg tablet   traMADol 50 mg tablet; Commonly known as: Ultram       Outpatient Follow-Up  Future Appointments   Date Time Provider Department Center   8/7/2025 10:30 AM [id number]       Walter Waters MD

## 2025-07-19 NOTE — CARE PLAN
The clinical goals for the shift include pt will have well controlled pain throughout this shift    Problem: Pain - Adult  Goal: Verbalizes/displays adequate comfort level or baseline comfort level  Outcome: Progressing     Problem: Safety - Adult  Goal: Free from fall injury  Outcome: Progressing     Problem: Discharge Planning  Goal: Discharge to home or other facility with appropriate resources  Outcome: Progressing     Problem: Chronic Conditions and Co-morbidities  Goal: Patient's chronic conditions and co-morbidity symptoms are monitored and maintained or improved  Outcome: Progressing     Problem: Nutrition  Goal: Nutrient intake appropriate for maintaining nutritional needs  Outcome: Progressing     Problem: Skin  Goal: Decreased wound size/increased tissue granulation at next dressing change  Outcome: Progressing  Flowsheets (Taken 7/19/2025 0347)  Decreased wound size/increased tissue granulation at next dressing change: Promote sleep for wound healing  Goal: Participates in plan/prevention/treatment measures  Outcome: Progressing  Flowsheets (Taken 7/19/2025 0347)  Participates in plan/prevention/treatment measures:   Elevate heels   Increase activity/out of bed for meals  Goal: Prevent/manage excess moisture  Outcome: Progressing  Flowsheets (Taken 7/19/2025 0347)  Prevent/manage excess moisture:   Monitor for/manage infection if present   Follow provider orders for dressing changes  Goal: Prevent/minimize sheer/friction injuries  Outcome: Progressing  Flowsheets (Taken 7/19/2025 0347)  Prevent/minimize sheer/friction injuries:   Increase activity/out of bed for meals   HOB 30 degrees or less  Goal: Promote/optimize nutrition  Outcome: Progressing  Flowsheets (Taken 7/19/2025 0347)  Promote/optimize nutrition:   Monitor/record intake including meals   Consume > 50% meals/supplements   Offer water/supplements/favorite foods  Goal: Promote skin healing  Outcome: Progressing  Flowsheets (Taken 7/19/2025  0347)  Promote skin healing:   Assess skin/pad under line(s)/device(s)   Protective dressings over bony prominences

## 2025-07-19 NOTE — CARE PLAN
The patient's goals for the shift include      The clinical goals for the shift include Pt will report and acceptable level of discomfort to allow for participation in ADL's without injury.    Over the shift, the patient did not make progress toward the following goals. Barriers to progression include unfamiliar hospital environment, new medical condition and medications. Recommendations to address these barriers include frequent observation, assist with repositioning and provide medications as needed and ensure call light within reach.

## 2025-07-19 NOTE — PROGRESS NOTES
07/19/25 0746   Discharge Planning   Living Arrangements Alone   Support Systems Children   Assistance Needed Patient is A&OX4, independent in ALDs, ambulates without assistive devices and drives. Patient wears no O2, CPAP or Bipap at baseline. No active HHC agency. Patient does not have a current PCP, [facility] provided directory and  number to establish.   Type of Residence Private residence   Number of Stairs to Enter Residence 5   Number of Stairs Within Residence 19  (up 6 then 8 steps and basement 5 steps)   Do you have animals or pets at home? Yes   Type of Animals or Pets 1 dog   Who is requesting discharge planning? Provider   Home or Post Acute Services None   Expected Discharge Disposition Home  (Home, no needs. Patient denied any home going needs or need for HHC.)   Does the patient need discharge transport arranged? No   Financial Resource Strain   How hard is it for you to pay for the very basics like food, housing, medical care, and heating? Not very   Housing Stability   In the last 12 months, was there a time when you were not able to pay the mortgage or rent on time? N   In the past 12 months, how many times have you moved where you were living? 0   At any time in the past 12 months, were you homeless or living in a shelter (including now)? N   Transportation Needs   In the past 12 months, has lack of transportation kept you from medical appointments or from getting medications? no   In the past 12 months, has lack of transportation kept you from meetings, work, or from getting things needed for daily living? No   Stroke Family Assessment   Stroke Family Assessment Needed No   Intensity of Service   Intensity of Service 0-30 min

## 2025-07-19 NOTE — NURSING NOTE
Discharge instructions reviewed with patient and his daughter. Pharmacist delivered and educated on new medications. All personal belongings packed up and pt elected to walk out to car parked near ED.

## 2025-07-21 ENCOUNTER — PHARMACY VISIT (OUTPATIENT)
Dept: PHARMACY | Facility: CLINIC | Age: 65
End: 2025-07-21
Payer: COMMERCIAL

## 2025-08-07 ENCOUNTER — HOSPITAL ENCOUNTER (OUTPATIENT)
Dept: RADIOLOGY | Facility: HOSPITAL | Age: 65
Discharge: HOME | End: 2025-08-07
Payer: MEDICARE

## 2025-08-07 ENCOUNTER — APPOINTMENT (OUTPATIENT)
Dept: ORTHOPEDIC SURGERY | Facility: CLINIC | Age: 65
End: 2025-08-07
Payer: MEDICARE

## 2025-08-07 DIAGNOSIS — M48.062 NEUROGENIC CLAUDICATION DUE TO LUMBAR SPINAL STENOSIS: Primary | ICD-10-CM

## 2025-08-07 DIAGNOSIS — M48.062 NEUROGENIC CLAUDICATION DUE TO LUMBAR SPINAL STENOSIS: ICD-10-CM

## 2025-08-07 PROCEDURE — 1125F AMNT PAIN NOTED PAIN PRSNT: CPT | Performed by: PHYSICIAN ASSISTANT

## 2025-08-07 PROCEDURE — 99024 POSTOP FOLLOW-UP VISIT: CPT | Performed by: PHYSICIAN ASSISTANT

## 2025-08-07 PROCEDURE — 72100 X-RAY EXAM L-S SPINE 2/3 VWS: CPT

## 2025-08-07 PROCEDURE — 72100 X-RAY EXAM L-S SPINE 2/3 VWS: CPT | Performed by: RADIOLOGY

## 2025-08-07 PROCEDURE — 1160F RVW MEDS BY RX/DR IN RCRD: CPT | Performed by: PHYSICIAN ASSISTANT

## 2025-08-07 PROCEDURE — 1111F DSCHRG MED/CURRENT MED MERGE: CPT | Performed by: PHYSICIAN ASSISTANT

## 2025-08-07 PROCEDURE — 1159F MED LIST DOCD IN RCRD: CPT | Performed by: PHYSICIAN ASSISTANT

## 2025-08-07 RX ORDER — PREDNISONE 20 MG/1
20 TABLET ORAL DAILY
Qty: 7 TABLET | Refills: 0 | Status: SHIPPED | OUTPATIENT
Start: 2025-08-07

## 2025-08-07 RX ORDER — OXYCODONE HYDROCHLORIDE 5 MG/1
5 TABLET ORAL EVERY 6 HOURS PRN
Qty: 28 TABLET | Refills: 0 | Status: SHIPPED | OUTPATIENT
Start: 2025-08-07 | End: 2025-08-14

## 2025-08-07 RX ORDER — METHOCARBAMOL 500 MG/1
TABLET, FILM COATED ORAL
Qty: 60 TABLET | Refills: 2 | Status: SHIPPED | OUTPATIENT
Start: 2025-08-07

## 2025-08-07 ASSESSMENT — PAIN SCALES - GENERAL: PAINLEVEL_OUTOF10: 8

## 2025-08-07 ASSESSMENT — PAIN - FUNCTIONAL ASSESSMENT: PAIN_FUNCTIONAL_ASSESSMENT: 0-10

## 2025-08-14 DIAGNOSIS — M48.062 NEUROGENIC CLAUDICATION DUE TO LUMBAR SPINAL STENOSIS: ICD-10-CM

## 2025-08-14 RX ORDER — OXYCODONE HYDROCHLORIDE 5 MG/1
5 TABLET ORAL EVERY 8 HOURS PRN
Qty: 21 TABLET | Refills: 0 | Status: SHIPPED | OUTPATIENT
Start: 2025-08-14 | End: 2025-08-21

## 2025-08-21 DIAGNOSIS — M48.062 NEUROGENIC CLAUDICATION DUE TO LUMBAR SPINAL STENOSIS: ICD-10-CM

## 2025-08-21 RX ORDER — OXYCODONE HYDROCHLORIDE 5 MG/1
5 TABLET ORAL EVERY 12 HOURS PRN
Qty: 14 TABLET | Refills: 0 | Status: SHIPPED | OUTPATIENT
Start: 2025-08-21 | End: 2025-08-28

## 2025-11-06 ENCOUNTER — APPOINTMENT (OUTPATIENT)
Dept: ORTHOPEDIC SURGERY | Facility: CLINIC | Age: 65
End: 2025-11-06
Payer: MEDICARE

## (undated) DEVICE — SUTURE, STRATAFIX PDS PLUS, 1, OS-8, SYMMETRIC 60CM DYED

## (undated) DEVICE — DRAPE, SHEET, XL

## (undated) DEVICE — NEEDLE, SPINAL, 20 G X 3.5 IN, YELLOW HUB

## (undated) DEVICE — COVER, PLASTIC, MAYO STAND, 29.5IN X 55.5IN

## (undated) DEVICE — DRAPE, INSTRUMENT, W/POUCH, STERI DRAPE, 7 X 11 IN, DISPOSABLE, STERILE

## (undated) DEVICE — DRESSING, GAUZE, SPONGE, 12 PLY, CURITY, 4 X 4 IN, STERILE

## (undated) DEVICE — COVER, C-ARM W/CLIPS, OEC GE

## (undated) DEVICE — Device

## (undated) DEVICE — SPONGE, LAP, XRAY DECT, 18IN X 18IN, W/LOOP, STERILE

## (undated) DEVICE — APPLICATOR, CHLORAPREP, W/ORANGE TINT, 26ML

## (undated) DEVICE — SOLUTION, IRRIGATION, 0.9% SODIUM CHLORIDE, 1000 ML, HANG BOTTLE

## (undated) DEVICE — SUTURE, STRATAFIX, 3-0 MONOCRYL PLUS, PS-2 SPIRAL UNDYED

## (undated) DEVICE — BUR, 4.0MM ROUND CARBIDE

## (undated) DEVICE — SEALANT, DURASEAL EXACT, 5ML

## (undated) DEVICE — DRAPE COVER, C ARM, FLOUROSCAN IMAGING SYS

## (undated) DEVICE — DRESSING, TRANSPARENT, TEGADERM, 6 X 8 IN

## (undated) DEVICE — WAX, BONE 2.5G, STERILE

## (undated) DEVICE — SUTURE, VICRYL, 2-0, 27 IN, FS-1, UNDYED

## (undated) DEVICE — CONTAINER, SPECIMEN, 120ML

## (undated) DEVICE — CAUTERY, PENCIL, PUSH BUTTON, SMOKE EVAC, 70MM

## (undated) DEVICE — COVER, TABLE, 44X90

## (undated) DEVICE — DRAPE, INCISE, ANTIMICROBIAL, IOBAN 2, 13 X 13 IN, DISPOSABLE, STERILE

## (undated) DEVICE — DRAPE, SHEET, 17 X 23 IN

## (undated) DEVICE — TIP,  ELECTRODE COATED INSULATED, EXTENDED, LF

## (undated) DEVICE — SEALANT, HEMOSTATIC, FLOSEAL, 10 ML

## (undated) DEVICE — DRAPE, SHEET, FAN FOLDED, HALF, 44 X 58 IN, DISPOSABLE, LF, STERILE

## (undated) DEVICE — SEALER, BIPOLAR, AQUA MANTYS 6.0

## (undated) DEVICE — CORD, CAUTERY, BIOPOLAR FORCEP, 12FT

## (undated) DEVICE — SYRINGE, 60 CC, IRRIGATION, BULB, CONTRO-BULB, PAPER POUCH

## (undated) DEVICE — CATHETER, IV, ANGIOCATH, 14 G X 1.88 IN, FEP POLYMER

## (undated) DEVICE — DRAPE PACK, MINOR, CUSTOM, GEAUGA